# Patient Record
Sex: FEMALE | Race: BLACK OR AFRICAN AMERICAN | Employment: UNEMPLOYED | URBAN - METROPOLITAN AREA
[De-identification: names, ages, dates, MRNs, and addresses within clinical notes are randomized per-mention and may not be internally consistent; named-entity substitution may affect disease eponyms.]

---

## 2017-11-02 ENCOUNTER — APPOINTMENT (OUTPATIENT)
Dept: GENERAL RADIOLOGY | Age: 27
End: 2017-11-02
Attending: EMERGENCY MEDICINE
Payer: SELF-PAY

## 2017-11-02 ENCOUNTER — HOSPITAL ENCOUNTER (EMERGENCY)
Age: 27
Discharge: HOME | End: 2017-11-02
Attending: EMERGENCY MEDICINE
Payer: SELF-PAY

## 2017-11-02 VITALS
HEART RATE: 98 BPM | WEIGHT: 150 LBS | TEMPERATURE: 97.7 F | HEIGHT: 65 IN | SYSTOLIC BLOOD PRESSURE: 128 MMHG | BODY MASS INDEX: 24.99 KG/M2 | RESPIRATION RATE: 17 BRPM | OXYGEN SATURATION: 100 % | DIASTOLIC BLOOD PRESSURE: 75 MMHG

## 2017-11-02 DIAGNOSIS — R10.84 ABDOMINAL PAIN, GENERALIZED: Primary | ICD-10-CM

## 2017-11-02 DIAGNOSIS — K59.00 CONSTIPATION, UNSPECIFIED CONSTIPATION TYPE: ICD-10-CM

## 2017-11-02 LAB
ALBUMIN SERPL-MCNC: 4.2 G/DL (ref 3.4–5)
ALBUMIN/GLOB SERPL: 0.8 {RATIO} (ref 0.8–1.7)
ALP SERPL-CCNC: 64 U/L (ref 45–117)
ALT SERPL-CCNC: 15 U/L (ref 13–56)
ANION GAP SERPL CALC-SCNC: 8 MMOL/L (ref 3–18)
APPEARANCE UR: CLEAR
AST SERPL-CCNC: 15 U/L (ref 15–37)
BACTERIA URNS QL MICRO: ABNORMAL /HPF
BASOPHILS # BLD: 0 K/UL (ref 0–0.06)
BASOPHILS NFR BLD: 0 % (ref 0–2)
BILIRUB SERPL-MCNC: 0.3 MG/DL (ref 0.2–1)
BILIRUB UR QL: NEGATIVE
BUN SERPL-MCNC: 12 MG/DL (ref 7–18)
BUN/CREAT SERPL: 16 (ref 12–20)
CALCIUM SERPL-MCNC: 9.3 MG/DL (ref 8.5–10.1)
CHLORIDE SERPL-SCNC: 103 MMOL/L (ref 100–108)
CO2 SERPL-SCNC: 30 MMOL/L (ref 21–32)
COLOR UR: YELLOW
CREAT SERPL-MCNC: 0.74 MG/DL (ref 0.6–1.3)
DIFFERENTIAL METHOD BLD: ABNORMAL
EOSINOPHIL # BLD: 0.1 K/UL (ref 0–0.4)
EOSINOPHIL NFR BLD: 2 % (ref 0–5)
EPITH CASTS URNS QL MICRO: ABNORMAL /LPF (ref 0–5)
ERYTHROCYTE [DISTWIDTH] IN BLOOD BY AUTOMATED COUNT: 13.1 % (ref 11.6–14.5)
GLOBULIN SER CALC-MCNC: 5.5 G/DL (ref 2–4)
GLUCOSE SERPL-MCNC: 100 MG/DL (ref 74–99)
GLUCOSE UR STRIP.AUTO-MCNC: NEGATIVE MG/DL
HCG UR QL: NEGATIVE
HCT VFR BLD AUTO: 44.2 % (ref 35–45)
HGB BLD-MCNC: 13.8 G/DL (ref 12–16)
HGB UR QL STRIP: NEGATIVE
KETONES UR QL STRIP.AUTO: NEGATIVE MG/DL
LEUKOCYTE ESTERASE UR QL STRIP.AUTO: ABNORMAL
LIPASE SERPL-CCNC: 138 U/L (ref 73–393)
LYMPHOCYTES # BLD: 1.5 K/UL (ref 0.9–3.6)
LYMPHOCYTES NFR BLD: 23 % (ref 21–52)
MCH RBC QN AUTO: 27.4 PG (ref 24–34)
MCHC RBC AUTO-ENTMCNC: 31.2 G/DL (ref 31–37)
MCV RBC AUTO: 87.9 FL (ref 74–97)
MONOCYTES # BLD: 0.4 K/UL (ref 0.05–1.2)
MONOCYTES NFR BLD: 6 % (ref 3–10)
MUCOUS THREADS URNS QL MICRO: ABNORMAL /LPF
NEUTS SEG # BLD: 4.5 K/UL (ref 1.8–8)
NEUTS SEG NFR BLD: 69 % (ref 40–73)
NITRITE UR QL STRIP.AUTO: NEGATIVE
PH UR STRIP: 6.5 [PH] (ref 5–8)
PLATELET # BLD AUTO: 422 K/UL (ref 135–420)
PMV BLD AUTO: 9.2 FL (ref 9.2–11.8)
POTASSIUM SERPL-SCNC: 3.8 MMOL/L (ref 3.5–5.5)
PROT SERPL-MCNC: 9.7 G/DL (ref 6.4–8.2)
PROT UR STRIP-MCNC: NEGATIVE MG/DL
RBC # BLD AUTO: 5.03 M/UL (ref 4.2–5.3)
RBC #/AREA URNS HPF: ABNORMAL /HPF (ref 0–5)
SODIUM SERPL-SCNC: 141 MMOL/L (ref 136–145)
SP GR UR REFRACTOMETRY: 1.03 (ref 1–1.03)
UROBILINOGEN UR QL STRIP.AUTO: 1 EU/DL (ref 0.2–1)
WBC # BLD AUTO: 6.5 K/UL (ref 4.6–13.2)
WBC URNS QL MICRO: ABNORMAL /HPF (ref 0–4)

## 2017-11-02 PROCEDURE — 80053 COMPREHEN METABOLIC PANEL: CPT | Performed by: EMERGENCY MEDICINE

## 2017-11-02 PROCEDURE — 96375 TX/PRO/DX INJ NEW DRUG ADDON: CPT

## 2017-11-02 PROCEDURE — 81025 URINE PREGNANCY TEST: CPT | Performed by: EMERGENCY MEDICINE

## 2017-11-02 PROCEDURE — 81001 URINALYSIS AUTO W/SCOPE: CPT | Performed by: EMERGENCY MEDICINE

## 2017-11-02 PROCEDURE — 85025 COMPLETE CBC W/AUTO DIFF WBC: CPT | Performed by: EMERGENCY MEDICINE

## 2017-11-02 PROCEDURE — 99283 EMERGENCY DEPT VISIT LOW MDM: CPT

## 2017-11-02 PROCEDURE — 83690 ASSAY OF LIPASE: CPT | Performed by: EMERGENCY MEDICINE

## 2017-11-02 PROCEDURE — 96374 THER/PROPH/DIAG INJ IV PUSH: CPT

## 2017-11-02 PROCEDURE — 96361 HYDRATE IV INFUSION ADD-ON: CPT

## 2017-11-02 PROCEDURE — 74022 RADEX COMPL AQT ABD SERIES: CPT

## 2017-11-02 PROCEDURE — 74011250636 HC RX REV CODE- 250/636: Performed by: EMERGENCY MEDICINE

## 2017-11-02 RX ORDER — POLYETHYLENE GLYCOL 3350 17 G/17G
17 POWDER, FOR SOLUTION ORAL DAILY
Qty: 119 G | Refills: 0 | Status: SHIPPED | OUTPATIENT
Start: 2017-11-02 | End: 2018-11-18

## 2017-11-02 RX ORDER — KETOROLAC TROMETHAMINE 30 MG/ML
30 INJECTION, SOLUTION INTRAMUSCULAR; INTRAVENOUS
Status: COMPLETED | OUTPATIENT
Start: 2017-11-02 | End: 2017-11-02

## 2017-11-02 RX ORDER — ONDANSETRON 2 MG/ML
4 INJECTION INTRAMUSCULAR; INTRAVENOUS
Status: COMPLETED | OUTPATIENT
Start: 2017-11-02 | End: 2017-11-02

## 2017-11-02 RX ADMIN — KETOROLAC TROMETHAMINE 30 MG: 30 INJECTION, SOLUTION INTRAMUSCULAR at 08:35

## 2017-11-02 RX ADMIN — ONDANSETRON 4 MG: 2 INJECTION INTRAMUSCULAR; INTRAVENOUS at 08:05

## 2017-11-02 RX ADMIN — SODIUM CHLORIDE 1000 ML: 900 INJECTION, SOLUTION INTRAVENOUS at 08:05

## 2017-11-02 NOTE — ED PROVIDER NOTES
HPI Comments: 7:51 AM Jen Galindo is a 28 y.o. female presents to ED c/o intermittent left sided flank pain ongoing for the past two months however the pain progressively worsened and became more constant . Pain level 8/10 and wakes her up out of her sleep. Tried Tylenol, no relief. Associated symptoms include vomiting, one time today and one time one month ago. Pt was last seen in the ED for the same symptoms two months ago. She was diagnosed with a pulled muscle and prescribed medication. Reports she took all the medication however there was no relief. While in ED last time she had no CT scan, only gave an urine sample. Pt states she has an appointment with her PCP on tomorrow however the pain increased, which prompted her to come to the ED on today. LMP two weeks ago. Denies any injury. Pt states probable chance of pregnancy however she has been having her normal menstrual cycle. No tobacco or illegal drug use. Drinks occasionally. No medical problems. Denies dysuria, fever, change in appetite. No further complaints at the moment. The history is provided by the patient. Past Medical History:   Diagnosis Date    Other ill-defined conditions     Vaginal birth 2012       No past surgical history on file. No family history on file. Social History     Social History    Marital status: SINGLE     Spouse name: N/A    Number of children: N/A    Years of education: N/A     Occupational History    Not on file. Social History Main Topics    Smoking status: Never Smoker    Smokeless tobacco: Not on file    Alcohol use No    Drug use: No    Sexual activity: Not on file     Other Topics Concern    Not on file     Social History Narrative    No narrative on file         ALLERGIES: Review of patient's allergies indicates no known allergies. Review of Systems   Constitutional: Negative. Negative for appetite change, chills, diaphoresis, fatigue and fever. HENT: Negative.   Negative for congestion, rhinorrhea and sore throat. Eyes: Negative. Negative for pain, discharge and redness. Respiratory: Negative. Negative for cough, chest tightness, shortness of breath and wheezing. Cardiovascular: Negative. Negative for chest pain and palpitations. Gastrointestinal: Positive for vomiting. Negative for abdominal pain, constipation, diarrhea and nausea. Genitourinary: Positive for flank pain. Negative for dysuria, frequency, hematuria and urgency. Musculoskeletal: Negative for back pain and neck pain. Skin: Negative. Negative for rash. Neurological: Negative. Negative for dizziness, syncope, weakness, light-headedness, numbness and headaches. Psychiatric/Behavioral: Negative. All other systems reviewed and are negative. Vitals:    11/02/17 0751 11/02/17 0755   BP: 128/75    Pulse: 98    Resp: 17    Temp:  97.7 °F (36.5 °C)   SpO2: 100%    Weight: 68 kg (150 lb)    Height: 5' 5\" (1.651 m)             Physical Exam   Constitutional: She appears well-developed and well-nourished. Non-toxic appearance. She does not have a sickly appearance. She does not appear ill. No distress. HENT:   Head: Normocephalic and atraumatic. Mouth/Throat: Oropharynx is clear and moist. No oropharyngeal exudate. Eyes: Conjunctivae and EOM are normal. Pupils are equal, round, and reactive to light. No scleral icterus. Neck: Normal range of motion. Neck supple. No hepatojugular reflux and no JVD present. No tracheal deviation present. No thyromegaly present. Cardiovascular: Normal rate, regular rhythm, S1 normal, S2 normal, normal heart sounds, intact distal pulses and normal pulses. Exam reveals no gallop, no S3 and no S4. No murmur heard. Pulses:       Radial pulses are 2+ on the right side, and 2+ on the left side. Dorsalis pedis pulses are 2+ on the right side, and 2+ on the left side. Pulmonary/Chest: Effort normal and breath sounds normal. No respiratory distress.  She has no decreased breath sounds. She has no wheezes. She has no rhonchi. She has no rales. Abdominal: Soft. Normal appearance and bowel sounds are normal. She exhibits no distension and no mass. There is no hepatosplenomegaly. There is no tenderness. There is no rigidity, no rebound, no guarding, no CVA tenderness, no tenderness at McBurney's point and negative Adams's sign. Musculoskeletal: Normal range of motion. Strength 5/5 throughout    Lymphadenopathy:        Head (right side): No submental, no submandibular, no preauricular and no occipital adenopathy present. Head (left side): No submental, no submandibular, no preauricular and no occipital adenopathy present. She has no cervical adenopathy. Right: No supraclavicular adenopathy present. Left: No supraclavicular adenopathy present. Neurological: She is alert. She has normal strength and normal reflexes. She is not disoriented. No cranial nerve deficit or sensory deficit. Coordination and gait normal. GCS eye subscore is 4. GCS verbal subscore is 5. GCS motor subscore is 6. Grossly intact    Skin: Skin is warm, dry and intact. No rash noted. She is not diaphoretic. Psychiatric: She has a normal mood and affect. Her speech is normal and behavior is normal. Judgment and thought content normal. Cognition and memory are normal.   Nursing note and vitals reviewed. MDM  Number of Diagnoses or Management Options  Abdominal pain, generalized:   Constipation, unspecified constipation type:   Diagnosis management comments: DDX: Gastritis, gerd, peptic ulcer disease, cholecystitis, pancreatitis, gastroenteritis, hepatitis, constipation related pain, appendicitis pain, diverticulitis, urinary tract infection, obstruction, abdominal wall pain, atypical cardiac (ami or anginal pain), referred pain from pulmonary process (pneumonia, empyema), ectopic pregnancy,  or combination of the above versus many other processes.       ED Course       Procedures      Labs essentially normal. Lipase normal. UA negative. Pregnancy negative. XR Abd, mild to moderate amount of stool. No acute process. 8:45 AM 11/2/2017    I have reassessed the patient. Patient is feeling better. Patient will be prescribed Miralax. Patient was discharged in stable condition. Patient is to return to emergency department if any new or worsening condition. Clinical Impression:   1. Abdominal Pain   2. Constipation        Scribe Attestation     Geena Echevarria (Aj) acting as a scribe for and in the presence of [facility], DO      November 02, 2017 at 8:10 AM       Provider Attestation:      I personally performed the services described in the documentation, reviewed the documentation, as recorded by the scribe in my presence, and it accurately and completely records my words and actions.  November 02, 2017 at 1102 [facility], DO

## 2017-11-02 NOTE — ED TRIAGE NOTES
Left lower abdominal pain that radiates to left low back \"since July\" with vomiting (5x) that started today.

## 2017-11-02 NOTE — ED NOTES
Diana Reddy is a 28 y.o. female that was discharged in stable condition. The patients diagnosis, condition and treatment were explained to  patient and aftercare instructions were given. The patient verbalized understanding. Patient armband removed and shredded.

## 2017-11-02 NOTE — DISCHARGE INSTRUCTIONS
Abdominal Pain: Care Instructions  Your Care Instructions    Abdominal pain has many possible causes. Some aren't serious and get better on their own in a few days. Others need more testing and treatment. If your pain continues or gets worse, you need to be rechecked and may need more tests to find out what is wrong. You may need surgery to correct the problem. Don't ignore new symptoms, such as fever, nausea and vomiting, urination problems, pain that gets worse, and dizziness. These may be signs of a more serious problem. Your doctor may have recommended a follow-up visit in the next 8 to 12 hours. If you are not getting better, you may need more tests or treatment. The doctor has checked you carefully, but problems can develop later. If you notice any problems or new symptoms, get medical treatment right away. Follow-up care is a key part of your treatment and safety. Be sure to make and go to all appointments, and call your doctor if you are having problems. It's also a good idea to know your test results and keep a list of the medicines you take. How can you care for yourself at home? · Rest until you feel better. · To prevent dehydration, drink plenty of fluids, enough so that your urine is light yellow or clear like water. Choose water and other caffeine-free clear liquids until you feel better. If you have kidney, heart, or liver disease and have to limit fluids, talk with your doctor before you increase the amount of fluids you drink. · If your stomach is upset, eat mild foods, such as rice, dry toast or crackers, bananas, and applesauce. Try eating several small meals instead of two or three large ones. · Wait until 48 hours after all symptoms have gone away before you have spicy foods, alcohol, and drinks that contain caffeine. · Do not eat foods that are high in fat. · Avoid anti-inflammatory medicines such as aspirin, ibuprofen (Advil, Motrin), and naproxen (Aleve).  These can cause stomach upset. Talk to your doctor if you take daily aspirin for another health problem. When should you call for help? Call 911 anytime you think you may need emergency care. For example, call if:  ? · You passed out (lost consciousness). ? · You pass maroon or very bloody stools. ? · You vomit blood or what looks like coffee grounds. ? · You have new, severe belly pain. ?Call your doctor now or seek immediate medical care if:  ? · Your pain gets worse, especially if it becomes focused in one area of your belly. ? · You have a new or higher fever. ? · Your stools are black and look like tar, or they have streaks of blood. ? · You have unexpected vaginal bleeding. ? · You have symptoms of a urinary tract infection. These may include:  ¨ Pain when you urinate. ¨ Urinating more often than usual.  ¨ Blood in your urine. ? · You are dizzy or lightheaded, or you feel like you may faint. ? Watch closely for changes in your health, and be sure to contact your doctor if:  ? · You are not getting better after 1 day (24 hours). Where can you learn more? Go to http://[employer].Mediakassi.info/. Enter K206 in the search box to learn more about \"Abdominal Pain: Care Instructions. \"  Current as of: March 20, 2017  Content Version: 11.4  © 4956-0468 [employer]. Care instructions adapted under license by [employer] (which disclaims liability or warranty for this information). If you have questions about a medical condition or this instruction, always ask your healthcare professional. [place] [address] any warranty or liability for your use of this information. Constipation: Care Instructions  Your Care Instructions    Constipation means that you have a hard time passing stools (bowel movements). People pass stools from 3 times a day to once every 3 days. What is normal for you may be different.  Constipation may occur with pain in the rectum and cramping. The pain may get worse when you try to pass stools. Sometimes there are small amounts of bright red blood on toilet paper or the surface of stools. This is because of enlarged veins near the rectum (hemorrhoids). A few changes in your diet and lifestyle may help you avoid ongoing constipation. Your doctor may also prescribe medicine to help loosen your stool. Some medicines can cause constipation. These include pain medicines and antidepressants. Tell your doctor about all the medicines you take. Your doctor may want to make a medicine change to ease your symptoms. Follow-up care is a key part of your treatment and safety. Be sure to make and go to all appointments, and call your doctor if you are having problems. It's also a good idea to know your test results and keep a list of the medicines you take. How can you care for yourself at home? · Drink plenty of fluids, enough so that your urine is light yellow or clear like water. If you have kidney, heart, or liver disease and have to limit fluids, talk with your doctor before you increase the amount of fluids you drink. · Include high-fiber foods in your diet each day. These include fruits, vegetables, beans, and whole grains. · Get at least 30 minutes of exercise on most days of the week. Walking is a good choice. You also may want to do other activities, such as running, swimming, cycling, or playing tennis or team sports. · Take a fiber supplement, such as Citrucel or Metamucil, every day. Read and follow all instructions on the label. · Schedule time each day for a bowel movement. A daily routine may help. Take your time having your bowel movement. · Support your feet with a small step stool when you sit on the toilet. This helps flex your hips and places your pelvis in a squatting position. · Your doctor may recommend an over-the-counter laxative to relieve your constipation. Examples are Milk of Magnesia and MiraLax.  Read and follow all instructions on the label. Do not use laxatives on a long-term basis. When should you call for help? Call your doctor now or seek immediate medical care if:  ? · You have new or worse belly pain. ? · You have new or worse nausea or vomiting. ? · You have blood in your stools. ? Watch closely for changes in your health, and be sure to contact your doctor if:  ? · Your constipation is getting worse. ? · You do not get better as expected. Where can you learn more? Go to http://almita-kassi.info/. Enter 21  in the search box to learn more about \"Constipation: Care Instructions. \"  Current as of: March 20, 2017  Content Version: 11.4  © 5402-3126 [employer]. Care instructions adapted under license by [employer] (which disclaims liability or warranty for this information). If you have questions about a medical condition or this instruction, always ask your healthcare professional. [place] [address] any warranty or liability for your use of this information.

## 2018-11-18 ENCOUNTER — HOSPITAL ENCOUNTER (EMERGENCY)
Age: 28
Discharge: HOME | End: 2018-11-18
Attending: EMERGENCY MEDICINE
Payer: MEDICAID

## 2018-11-18 VITALS
WEIGHT: 140 LBS | TEMPERATURE: 98.2 F | HEIGHT: 65 IN | OXYGEN SATURATION: 100 % | DIASTOLIC BLOOD PRESSURE: 66 MMHG | RESPIRATION RATE: 16 BRPM | SYSTOLIC BLOOD PRESSURE: 107 MMHG | HEART RATE: 83 BPM | BODY MASS INDEX: 23.32 KG/M2

## 2018-11-18 DIAGNOSIS — J02.9 ACUTE PHARYNGITIS, UNSPECIFIED ETIOLOGY: Primary | ICD-10-CM

## 2018-11-18 PROCEDURE — 99282 EMERGENCY DEPT VISIT SF MDM: CPT

## 2018-11-18 RX ORDER — ACETAMINOPHEN 325 MG/1
TABLET ORAL
COMMUNITY
End: 2019-03-31

## 2018-11-18 RX ORDER — PENICILLIN V POTASSIUM 500 MG/1
500 TABLET, FILM COATED ORAL 3 TIMES DAILY
Qty: 30 TAB | Refills: 0 | Status: SHIPPED | OUTPATIENT
Start: 2018-11-18 | End: 2018-11-28

## 2018-11-18 NOTE — ED PROVIDER NOTES
EMERGENCY DEPARTMENT HISTORY AND PHYSICAL EXAM 
 
9:23 AM 
 
 
Date: 11/18/2018 Patient Name: Greyson Alvarado History of Presenting Illness Chief Complaint Patient presents with  Sore Throat History Provided By: Patient Chief Complaint: Sore Throat Duration: 10 Days Timing:  Constant Location: N/A Quality: N/A Severity: N/A Modifying Factors: Exacerbated by swallowing; no relief with Motrin Associated Symptoms: Pain to right side of tongue; \"popping\" sensation in ears Additional History (Context): Greyson Alvarado is a 33 y.o. female presenting to the ED c/o constant sore throat for the past 10 days. Reports taking Motrin with no relief. States sore throat is exacerbated by swallowing. Pt also reports pain to the right side of her tongue and \"popping\" sensation in her ears when chewing. States she saw her PCP 6 days ago and had negative strep test. Denies rash and any other symptoms or complaints. PCP: None Current Outpatient Medications Medication Sig Dispense Refill  acetaminophen (TYLENOL) 325 mg tablet Take  by mouth every four (4) hours as needed for Pain.  penicillin v potassium (VEETID) 500 mg tablet Take 1 Tab by mouth three (3) times daily for 10 days. 30 Tab 0 Past History Past Medical History: 
Past Medical History:  
Diagnosis Date  Other ill-defined conditions(929.37) Vaginal birth 2012  Ovarian cyst   
 
 
Past Surgical History: 
History reviewed. No pertinent surgical history. Family History: 
History reviewed. No pertinent family history. Social History: 
Social History Tobacco Use  Smoking status: Never Smoker  Smokeless tobacco: Never Used Substance Use Topics  Alcohol use: No  
 Drug use: No  
 
 
Allergies: 
No Known Allergies Review of Systems Review of Systems Constitutional: Negative for fever. HENT: Positive for sore throat. Respiratory: Negative for shortness of breath. All other systems reviewed and are negative. Physical Exam  
 
Visit Vitals /66 Pulse 83 Temp 98.2 °F (36.8 °C) Resp 16 Ht 5' 5\" (1.651 m) Wt 63.5 kg (140 lb) LMP 11/09/2018 SpO2 100% BMI 23.30 kg/m² Physical Exam  
Constitutional: She is oriented to person, place, and time. She appears well-developed and well-nourished. No distress. HENT:  
Head: Normocephalic and atraumatic. Fluid post R TM, normal color. OP mildly injected, no exudate. Tongue w/o lesions. Tiny apthous ulcer to inner lower R cheek Eyes: No scleral icterus. Cardiovascular: Normal rate and regular rhythm. Pulmonary/Chest: Effort normal and breath sounds normal. No respiratory distress. She has no wheezes. Lymphadenopathy:  
  She has no cervical adenopathy. Neurological: She is alert and oriented to person, place, and time. Skin: Skin is warm and dry. Psychiatric: She has a normal mood and affect. Nursing note and vitals reviewed. Diagnostic Study Results Labs - No results found for this or any previous visit (from the past 12 hour(s)). Radiologic Studies - No orders to display Medical Decision Making I am the first provider for this patient. I reviewed the vital signs, available nursing notes, past medical history, past surgical history, family history and social history. Vital Signs-Reviewed the patient's vital signs. Records Reviewed: Nursing Notes and Old Medical Records (Time of Review: 9:23 AM) ED Course: Progress Notes, Reevaluation, and Consults: 
Imp; Pharyngitis. Seen w/ neg strep. Ongoing sxs x 10 days. No fever or lymph node enlargement to suggest mono. Will empirically cover for non-steep bacterial sources although likely viral w/ lingering symptoms. Diagnosis Clinical Impression: 1. Acute pharyngitis, unspecified etiology 1) Salt water gargles 2) Rest 
3) Plenty of fluids 4) Tylenol or Motrin for pain and fever 5) Penicillin 6) Follow up with your PCP for recheck in 5-7 days if not improved. Disposition: home Follow-up Information Follow up With Specialties Details Why Contact Info [facility]  In 1 week As needed [address] [place] [address] 
[phone] Medication List  
  
START taking these medications   
penicillin v potassium 500 mg tablet Commonly known as:  VEETID Take 1 Tab by mouth three (3) times daily for 10 days. ASK your doctor about these medications TYLENOL 325 mg tablet Generic drug:  acetaminophen Where to Get Your Medications Information about where to get these medications is not yet available Ask your nurse or doctor about these medications · penicillin v potassium 500 mg tablet 
  
 
_______________________________ Scribe Attestation Levan Kayser acting as a scribe for and in the presence of Lisa Montano MD     
November 18, 2018 at 9:23 AM 
    
Provider Attestation:     
I personally performed the services described in the documentation, reviewed the documentation, as recorded by the scribe in my presence, and it accurately and completely records my words and actions. November 18, 2018 at 9:23 AM - Lisa Montano MD   
 
_______________________________

## 2018-11-18 NOTE — ED TRIAGE NOTES
Pt reports 11/8 developed sore throat and using OTC medication without relief. Saw PMD this past week and given medication for Thrush. States today had tongue pain and painful swallowing. Handing oral secretions and speaking in full sentences without difficulty. Presents in NAD.

## 2018-11-18 NOTE — DISCHARGE INSTRUCTIONS
1) Salt water gargles  2) Rest  3) Plenty of fluids  4) Tylenol or Motrin for pain and fever  5) Penicillin  6) Follow up with your PCP for recheck in 5-7 days if not improved. Sore Throat: Care Instructions  Your Care Instructions    Infection by bacteria or a virus causes most sore throats. Cigarette smoke, dry air, air pollution, allergies, and yelling can also cause a sore throat. Sore throats can be painful and annoying. Fortunately, most sore throats go away on their own. If you have a bacterial infection, your doctor may prescribe antibiotics. Follow-up care is a key part of your treatment and safety. Be sure to make and go to all appointments, and call your doctor if you are having problems. It's also a good idea to know your test results and keep a list of the medicines you take. How can you care for yourself at home? · If your doctor prescribed antibiotics, take them as directed. Do not stop taking them just because you feel better. You need to take the full course of antibiotics. · Gargle with warm salt water once an hour to help reduce swelling and relieve discomfort. Use 1 teaspoon of salt mixed in 1 cup of warm water. · Take an over-the-counter pain medicine, such as acetaminophen (Tylenol), ibuprofen (Advil, Motrin), or naproxen (Aleve). Read and follow all instructions on the label. · Be careful when taking over-the-counter cold or flu medicines and Tylenol at the same time. Many of these medicines have acetaminophen, which is Tylenol. Read the labels to make sure that you are not taking more than the recommended dose. Too much acetaminophen (Tylenol) can be harmful. · Drink plenty of fluids. Fluids may help soothe an irritated throat. Hot fluids, such as tea or soup, may help decrease throat pain. · Use over-the-counter throat lozenges to soothe pain. Regular cough drops or hard candy may also help. These should not be given to young children because of the risk of choking.   · Do not smoke or allow others to smoke around you. If you need help quitting, talk to your doctor about stop-smoking programs and medicines. These can increase your chances of quitting for good. · Use a vaporizer or humidifier to add moisture to your bedroom. Follow the directions for cleaning the machine. When should you call for help? Call your doctor now or seek immediate medical care if:    · You have new or worse trouble swallowing.     · Your sore throat gets much worse on one side.    Watch closely for changes in your health, and be sure to contact your doctor if you do not get better as expected. Where can you learn more? Go to http://almita-kassi.info/. Enter [id number] [id number] in the search box to learn more about \"Sore Throat: Care Instructions. \"  Current as of: March 28, 2018  Content Version: 11.8  © 9914-9490 Healthwise[employer]. Care instructions adapted under license by [employer] (which disclaims liability or warranty for this information). If you have questions about a medical condition or this instruction, always ask your healthcare professional. [address] any warranty or liability for your use of this information.

## 2018-11-18 NOTE — ED NOTES
I have reviewed discharge instructions with the patient. The patient verbalized understanding. Current Discharge Medication List  
  
START taking these medications Details  
penicillin v potassium (VEETID) 500 mg tablet Take 1 Tab by mouth three (3) times daily for 10 days. Qty: 30 Tab, Refills: 0 Patient armband removed and shredded

## 2019-01-15 NOTE — PROGRESS NOTES
[address], [address], 9360 [facility]  [address]  [place], [address]   [phone]  Manny Fothergill DO      Patient ID:  Name:  Valdez Gil  MRN:  [id number]  :  1990/28 y.o. Date:  2019      HISTORY OF PRESENT ILLNESS:  Valdez Gil is a 33 y.o.   premenopausal female referred by Dr. Olga Burris for left ovarian cyst. Ultrasound on 19 revealed 9 cm left septated cystic mass. U/S from  showed left complex cyst. She was  then scheduled for LSO with Dr. Olga Burris. Unfortunately her  was elevated to 103.9 which prompted referral to our practice. Currently, Denies Vaginal bleeding, change in vaginal discharge, new abdominopelvic pain, change in bladder/bowel habits      Labs:  2019 : 103.9      Imaging  2019 TVUS  Uterus: 5.56 x 4.40 x 5.20 cm  Endometrium: 1.7 cm  Cervical length: 5.10 cm  Firbroma: 1.26 x 1.06 x 1.61 cm   L ovary: 3.87 x 2.75 x 3.44 cm   R ovary: 3.64 x 3.10 x 3.99 cm   L mass: 9.13 x 6.27 x 7.69    IMPRESSION:    The uterus is anteverted and homogenous in appearance. The lining is thick, a small anterior intramural myoma is identified. The right ovary appears normal. There is a septated left cystic mass measured  Above. No free fluid seen. ROS:   As above      There are no active problems to display for this patient. Past Medical History:   Diagnosis Date    Other ill-defined conditions(799.89)     Vaginal birth     Ovarian cyst       History reviewed. No pertinent surgical history.    OB History     No data available        Social History     Tobacco Use    Smoking status: Never Smoker    Smokeless tobacco: Never Used   Substance Use Topics    Alcohol use: No     Comment: socially      Family History   Problem Relation Age of Onset   [facility] Breast Cancer Mother     Diabetes Mother     Cancer Maternal Aunt     Cancer Maternal Grandmother     Cancer Maternal Grandfather     Prostate Cancer Paternal Grandfather     Diabetes Paternal Grandfather       Current Outpatient Medications   Medication Sig    naproxen sodium (ALEVE) 220 mg cap Take  by mouth.  acetaminophen (TYLENOL) 325 mg tablet Take  by mouth every four (4) hours as needed for Pain. No current facility-administered medications for this visit. No Known Allergies       OBJECTIVE:    Physical Exam  VITAL SIGNS: Visit Vitals  /82 (BP 1 Location: Left arm, BP Patient Position: Sitting)   Pulse (!) 107   Temp 97.6 °F (36.4 °C) (Oral)   Resp 18   Ht 5' 5\" (1.651 m)   Wt 61 kg (134 lb 8 oz)   LMP 01/05/2019 (Exact Date)   SpO2 100%   BMI 22.38 kg/m²      GENERAL ISIDRA: in no apparent distress and well developed and well nourished   MUSCULOSKEL: no joint tenderness, deformity or swelling   INTEGUMENT:  warm and dry, no rashes or lesions   ABDOMEN . soft, NT, ND, No masses appreciated   EXTREMITIES: extremities normal, atraumatic, no cyanosis or edema   PELVIC: External genitalia: normal general appearance  Vaginal: normal mucosa without prolapse or lesions  Cervix: normal appearance  Adnexa: mass, left  Uterus: normal single, nontender   RECTAL: deferred   ELIOT SURVEY: Cervical, supraclavicular, axillary and inguinal nodes normal.   NEURO: Grossly normal         IMPRESSION/PLAN:  1. Pelvic mass, elevated    -reviewed her imaging and bloodwork and explained likely benign, however, would recommend surgical evaluation   -discussed recommendation for resection of mass/LSO, with intraop pathology and staging if malignant   -discussed laparoscopic approach. -surgery to be scheduled at [facility] on 1/30   -Risks, benefits and alternatives of surgery discussed in detail and written consent obtained. Risks including bleeding, infection, blood clot, injury to nearby organs including bladder, bowel, ureters and blood vessels.     -will add LDH, Inhibin, AFP to preop labs          The total time spent was 60 minutes regarding this patients diagnosis of pelvic mass, elevated  and >50% of this time was spent counseling and coordinating care    [address] [place] [address]  Gynecologic Oncology  1/18/201910:28 AM

## 2019-01-18 ENCOUNTER — OFFICE VISIT (OUTPATIENT)
Dept: ONCOLOGY | Age: 29
End: 2019-01-18

## 2019-01-18 ENCOUNTER — HOSPITAL ENCOUNTER (OUTPATIENT)
Dept: LAB | Age: 29
Discharge: HOME | End: 2019-01-18

## 2019-01-18 VITALS
DIASTOLIC BLOOD PRESSURE: 82 MMHG | HEART RATE: 107 BPM | OXYGEN SATURATION: 100 % | RESPIRATION RATE: 18 BRPM | BODY MASS INDEX: 22.41 KG/M2 | TEMPERATURE: 97.6 F | WEIGHT: 134.5 LBS | SYSTOLIC BLOOD PRESSURE: 125 MMHG | HEIGHT: 65 IN

## 2019-01-18 DIAGNOSIS — R19.00 PELVIC MASS IN FEMALE: ICD-10-CM

## 2019-01-18 DIAGNOSIS — Z01.818 PREOP TESTING: ICD-10-CM

## 2019-01-18 DIAGNOSIS — R19.00 PELVIC MASS IN FEMALE: Primary | ICD-10-CM

## 2019-01-18 LAB — SENTARA SPECIMEN COL,SENBCF: NORMAL

## 2019-01-18 PROCEDURE — 99001 SPECIMEN HANDLING PT-LAB: CPT

## 2019-01-18 RX ORDER — CHOLECALCIFEROL (VITAMIN D3) 125 MCG
CAPSULE ORAL AS NEEDED
COMMUNITY

## 2019-01-18 NOTE — PROGRESS NOTES
Rachel Hunt, a 33 y.o. female,  is here for   Chief Complaint   Patient presents with    New Patient     referred by Dr. Denise Mac, elevaed , cystic mass       Visit Vitals  /82 (BP 1 Location: Left arm, BP Patient Position: Sitting)   Pulse (!) 107   Temp 97.6 °F (36.4 °C) (Oral)   Resp 18   Ht 5' 5\" (1.651 m)   Wt 61 kg (134 lb 8 oz)   SpO2 100%   BMI 22.38 kg/m²       Patient reports some left side pain in her abdomen, stats she usually has some pain after menstrual cycle is completed, \" not much this month though \"  Patient denies any persistent or worsening abdominal or pelvic pain. Denies any unusual vaginal bleeding, discharge, irritation, or odor. Denies experiencing any diarrhea, explains she does have issues with constipation but admits she doesn't drink water. No burning, discomfort, or irritation with urination.

## 2019-01-18 NOTE — PATIENT INSTRUCTIONS
How to Prepare for Surgery  How do you prepare for surgery? Surgery can be stressful. This information will help you understand what you can expect. And it will help you safely prepare for surgery. Follow-up care is a key part of your treatment and safety. Be sure to make and go to all appointments, and call your doctor if you are having problems. It's also a good idea to know your test results and keep a list of the medicines you take. What happens before surgery?   Preparing for surgery    · Understand exactly what surgery is planned, along with the risks, benefits, and other options. · Tell your doctors ALL the medicines, vitamins, supplements, and herbal remedies you take. Some of these can increase the risk of bleeding or interact with anesthesia.     · If you take blood thinners, such as warfarin (Coumadin), clopidogrel (Plavix), or aspirin, be sure to talk to your doctor. He or she will tell you if you should stop taking these medicines before your surgery. Make sure that you understand exactly what your doctor wants you to do.     · Your doctor will tell you which medicines to take or stop before your surgery. You may need to stop taking certain medicines a week or more before surgery. So talk to your doctor as soon as you can.     · If you have an advance directive, let your doctor know. It may include a living will and a durable power of  for health care. Bring a copy to the hospital. If don't have one, you may want to prepare one. It lets your doctor and loved ones know your health care wishes. Doctors advise that everyone prepare these papers before any type of surgery or procedure. What happens on the day of surgery?    · Follow the instructions about when to stop eating and drinking. If you don't, your surgery may be canceled.  If your doctor told you to take your medicines on the day of surgery, take them with only a sip of water.     · Take a bath or shower before coming in for your surgery. Do not apply lotions, perfumes, deodorants, or nail polish.     · Do not shave the surgical site yourself.     · Take off all jewelry and piercings. And take out contact lenses, if you wear them.    At the hospital or surgery center   · Bring a picture ID.     · The area for surgery is often marked to make sure there are no errors.     · You will be kept comfortable and safe by your anesthesia provider. The anesthesia may make you sleep. Or it may just numb the area being worked on. Going home   · Be sure you have someone to drive you home. Anesthesia and pain medicine make it unsafe for you to drive.     · You will be given more specific instructions about recovering from your surgery. They will cover things like diet, wound care, follow-up care, driving, and getting back to your normal routine. When should you call your doctor? · You have questions or concerns.     · You don't understand how to prepare for your surgery.     · You become ill before the surgery (such as fever, flu, or a cold).     · You need to reschedule or have changed your mind about having the surgery. Where can you learn more? Go to http://almita-kassi.info/. Enter Q270 in the search box to learn more about \"How to Prepare for Surgery. \"  Current as of: March 28, 2018  Content Version: 11.9  © 2539-9464 [employer]. Care instructions adapted under license by [employer] (which disclaims liability or warranty for this information). If you have questions about a medical condition or this instruction, always ask your healthcare professional. [place] [address] any warranty or liability for your use of this information.

## 2019-01-20 LAB
ABSOLUTE LYMPHOCYTE COUNT, 10803: 1.9 K/UL (ref 1–4.8)
AFP, SERUM, TUMOR MARKER, 002266: 2.4 NG/ML
ANION GAP SERPL CALC-SCNC: 13 MMOL/L
BASOPHILS # BLD: 0 K/UL (ref 0–0.2)
BASOPHILS NFR BLD: 0 % (ref 0–2)
BUN SERPL-MCNC: 10 MG/DL (ref 6–22)
CALCIUM SERPL-MCNC: 8.9 MG/DL (ref 8.4–10.5)
CHLORIDE SERPL-SCNC: 101 MMOL/L (ref 98–110)
CO2 SERPL-SCNC: 27 MMOL/L (ref 20–32)
CREAT SERPL-MCNC: 0.5 MG/DL (ref 0.5–1.2)
EOSINOPHIL # BLD: 0.2 K/UL (ref 0–0.5)
EOSINOPHIL NFR BLD: 3 % (ref 0–6)
ERYTHROCYTE [DISTWIDTH] IN BLOOD BY AUTOMATED COUNT: 13.9 % (ref 10–15.5)
GFRAA, 66117: >60
GFRNA, 66118: >60
GLUCOSE SERPL-MCNC: 81 MG/DL (ref 70–99)
GRANULOCYTES,GRANS: 62 % (ref 40–75)
HCG, BETA, HCGTLT: <1 MIU/ML
HCT VFR BLD AUTO: 38.3 % (ref 35.1–46.5)
HGB BLD-MCNC: 11.8 G/DL (ref 11.7–15.5)
LDH SERPL L TO P-CCNC: 163 U/L (ref 98–192)
LYMPHOCYTES, LYMLT: 27 % (ref 20–45)
MCH RBC QN AUTO: 29 PG (ref 26–34)
MCHC RBC AUTO-ENTMCNC: 31 G/DL (ref 31–36)
MCV RBC AUTO: 93 FL (ref 80–95)
MONOCYTES # BLD: 0.5 K/UL (ref 0.1–1)
MONOCYTES NFR BLD: 7 % (ref 3–12)
NEUTROPHILS # BLD AUTO: 4.3 K/UL (ref 1.8–7.7)
PLATELET # BLD AUTO: 424 K/UL (ref 140–440)
PMV BLD AUTO: 9.9 FL (ref 9–13)
POTASSIUM SERPL-SCNC: 4.4 MMOL/L (ref 3.5–5.5)
RBC # BLD AUTO: 4.13 M/UL (ref 3.8–5.2)
SODIUM SERPL-SCNC: 141 MMOL/L (ref 133–145)
WBC # BLD AUTO: 6.8 K/UL (ref 4–11)

## 2019-01-23 NOTE — PROGRESS NOTES
mIan iWlkins, a 33 y.o. female,  is here for   Chief Complaint   Patient presents with    Patient Education     surgical consult       Visit Vitals  /69 (BP 1 Location: Right arm, BP Patient Position: Sitting)   Pulse 80   Temp 98.1 °F (36.7 °C) (Oral)   Resp 18   Ht 5' 5\" (1.651 m)   Wt 61 kg (134 lb 6.4 oz)   SpO2 100%   BMI 22.37 kg/m²       Jyoti Simpson was educated and given pre-operative and post-operative instructions for Laparoscopic resection of pelvic mass, laparoscopic salpingo oopherectomy, possible [facility] time [facility] 1000 am. Patient signed a consent form, and expressed an understanding of the procedure. All patients questions were addressed. Patient was given the office number if any questions or concerns were to arise.

## 2019-01-24 ENCOUNTER — OFFICE VISIT (OUTPATIENT)
Dept: ONCOLOGY | Age: 29
End: 2019-01-24

## 2019-01-24 VITALS
TEMPERATURE: 98.1 F | HEIGHT: 65 IN | RESPIRATION RATE: 18 BRPM | SYSTOLIC BLOOD PRESSURE: 108 MMHG | OXYGEN SATURATION: 100 % | WEIGHT: 134.4 LBS | BODY MASS INDEX: 22.39 KG/M2 | DIASTOLIC BLOOD PRESSURE: 69 MMHG | HEART RATE: 80 BPM

## 2019-01-24 DIAGNOSIS — Z71.89 ENCOUNTER FOR SURGICAL COUNSELING: Primary | ICD-10-CM

## 2019-01-24 NOTE — PATIENT INSTRUCTIONS
Laparoscopic Oophorectomy: Before Your Surgery  What is a laparoscopic oophorectomy? Oophorectomy (say \"nm-an-qlc-REK-tuh-twyla\") is a type of surgery. It removes one or both of your ovaries. Your ovaries store and release eggs so that you can get pregnant. They also make female sex hormones. You will be asleep during the surgery. The doctor puts a lighted tube and other tools through small cuts in your belly. The cuts are called incisions. The tube is called a scope. It lets your doctor see your ovaries. If it's too hard to work through the scope, the doctor may make a larger incision. The incisions leave scars that fade with time. After surgery, you will probably have pain for several days. If the doctor takes out both ovaries, you can no longer get pregnant. You will also start menopause if you haven't already started it. Follow-up care is a key part of your treatment and safety. Be sure to make and go to all appointments, and call your doctor if you are having problems. It's also a good idea to know your test results and keep a list of the medicines you take. What happens before surgery?   Surgery can be stressful. This information will help you understand what you can expect. And it will help you safely prepare for surgery.   Preparing for surgery    · Understand exactly what surgery is planned, along with the risks, benefits, and other options. · Tell your doctors ALL the medicines, vitamins, supplements, and herbal remedies you take. Some of these can increase the risk of bleeding or interact with anesthesia.     · If you take blood thinners, such as warfarin (Coumadin), clopidogrel (Plavix), or aspirin, be sure to talk to your doctor. He or she will tell you if you should stop taking these medicines before your surgery. Make sure that you understand exactly what your doctor wants you to do.     · Your doctor will tell you which medicines to take or stop before your surgery.  You may need to stop taking certain medicines a week or more before surgery. So talk to your doctor as soon as you can.     · If you have an advance directive, let your doctor know. It may include a living will and a durable power of  for health care. Bring a copy to the hospital. If you don't have one, you may want to prepare one. It lets your doctor and loved ones know your health care wishes. Doctors advise that everyone prepare these papers before any type of surgery or procedure. What happens on the day of surgery? · Follow the instructions exactly about when to stop eating and drinking. If you don't, your surgery may be canceled. If your doctor told you to take your medicines on the day of surgery, take them with only a sip of water.     · Take a bath or shower before you come in for your surgery. Do not apply lotions, perfumes, deodorants, or nail polish.     · Do not shave the surgical site yourself.     · Take off all jewelry and piercings. And take out contact lenses, if you wear them.    At the hospital or surgery center   · Bring a picture ID.     · The area for surgery is often marked to make sure there are no errors.     · You will be kept comfortable and safe by your anesthesia provider. You will be asleep during the surgery. Going home   · Be sure you have someone to drive you home. Anesthesia and pain medicine make it unsafe for you to drive.     · You will be given more specific instructions about recovering from your surgery. They will cover things like diet, wound care, follow-up care, driving, and getting back to your normal routine. When should you call your doctor? · You have questions or concerns.     · You don't understand how to prepare for your surgery.     · You become ill before the surgery (such as fever, flu, or a cold).     · You need to reschedule or have changed your mind about having the surgery. Where can you learn more? Go to http://almita-kassi.info/.   Enter C476 in the search box to learn more about \"Laparoscopic Oophorectomy: Before Your Surgery. \"  Current as of: May 14, 2018  Content Version: 11.9  © 9913-8973 [employer]. Care instructions adapted under license by [employer] (which disclaims liability or warranty for this information). If you have questions about a medical condition or this instruction, always ask your healthcare professional. [place] [address] any warranty or liability for your use of this information.

## 2019-01-25 ENCOUNTER — HOSPITAL ENCOUNTER (OUTPATIENT)
Dept: LAB | Age: 29
Discharge: HOME | End: 2019-01-25

## 2019-01-25 ENCOUNTER — HOSPITAL ENCOUNTER (OUTPATIENT)
Dept: PREADMISSION TESTING | Age: 29
Discharge: HOME | End: 2019-01-25
Payer: MEDICAID

## 2019-01-25 DIAGNOSIS — Z01.818 PREOP TESTING: ICD-10-CM

## 2019-01-25 LAB
ABO + RH BLD: NORMAL
BLOOD GROUP ANTIBODIES SERPL: NORMAL
SPECIMEN EXP DATE BLD: NORMAL

## 2019-01-25 PROCEDURE — 86900 BLOOD TYPING SEROLOGIC ABO: CPT

## 2019-01-25 PROCEDURE — 36415 COLL VENOUS BLD VENIPUNCTURE: CPT

## 2019-01-25 PROCEDURE — 99001 SPECIMEN HANDLING PT-LAB: CPT

## 2019-01-29 LAB — SENTARA SPECIMEN COL,SENBCF: NORMAL

## 2019-03-31 ENCOUNTER — HOSPITAL ENCOUNTER (EMERGENCY)
Age: 29
Discharge: HOME | End: 2019-03-31
Attending: EMERGENCY MEDICINE | Admitting: EMERGENCY MEDICINE
Payer: MEDICAID

## 2019-03-31 ENCOUNTER — APPOINTMENT (OUTPATIENT)
Dept: GENERAL RADIOLOGY | Age: 29
End: 2019-03-31
Attending: PHYSICIAN ASSISTANT
Payer: MEDICAID

## 2019-03-31 VITALS
SYSTOLIC BLOOD PRESSURE: 114 MMHG | RESPIRATION RATE: 18 BRPM | BODY MASS INDEX: 21.66 KG/M2 | TEMPERATURE: 99.6 F | HEIGHT: 65 IN | HEART RATE: 95 BPM | OXYGEN SATURATION: 99 % | WEIGHT: 130 LBS | DIASTOLIC BLOOD PRESSURE: 71 MMHG

## 2019-03-31 DIAGNOSIS — J06.9 VIRAL URI WITH COUGH: Primary | ICD-10-CM

## 2019-03-31 PROCEDURE — 87081 CULTURE SCREEN ONLY: CPT

## 2019-03-31 PROCEDURE — 71046 X-RAY EXAM CHEST 2 VIEWS: CPT

## 2019-03-31 PROCEDURE — 99282 EMERGENCY DEPT VISIT SF MDM: CPT

## 2019-03-31 RX ORDER — BENZONATATE 100 MG/1
100 CAPSULE ORAL
Qty: 30 CAP | Refills: 0 | Status: SHIPPED | OUTPATIENT
Start: 2019-03-31 | End: 2019-04-07

## 2019-03-31 NOTE — LETTER
NOTIFICATION OF RETURN TO WORK / SCHOOL 
3/31/2019 Ms. Yas Dos Santos [place] [id number] [address] [address] To Whom it may concern, Please excuse Yas Dos Santos from work 4/1/19 for medical reasons.    
 
 
 
 
 
 
Sincerely,  
 
 
 
 
Mikel Parada PA-C

## 2019-03-31 NOTE — ED TRIAGE NOTES
Pt reports week long history of cough, sneezing, generalized malaise, throat irritation. Was seen at urgent care and was told could be allergy related but medication not helping.

## 2019-03-31 NOTE — ED PROVIDER NOTES
EMERGENCY DEPARTMENT HISTORY AND PHYSICAL EXAM 
 
7:53 PM 
 
 
Date: 3/31/2019 Patient Name: Carolyn Arzola History of Presenting Illness Chief Complaint Patient presents with  Flu Like Symptoms History Provided By: Patient Chief Complaint: cough Additional History (Context): Carolyn Arzola is a 33 y.o. female with No significant past medical history who presents with cough. She has dry cough, nasal congestion, scratchy throat that started yesterday. She denies fevers, body aches. But does feel chills. Denies nausea, vomiting, diarrhea, abdominal pain, dysuria. No other symptoms. The pain is 7 out of 10. She has taken Mucinex for the symptoms without relief. PCP: JOSEPHINE Mariano Current Outpatient Medications Medication Sig Dispense Refill  benzonatate (TESSALON PERLES) 100 mg capsule Take 1 Cap by mouth three (3) times daily as needed for Cough for up to 7 days. 30 Cap 0  
 naproxen sodium (ALEVE) 220 mg cap Take  by mouth. Past History Past Medical History: 
Past Medical History:  
Diagnosis Date  Other ill-defined conditions(799.89) Vaginal birth 2012  Ovarian cyst   
 
 
Past Surgical History: 
History reviewed. No pertinent surgical history. Family History: 
Family History Problem Relation Age of Onset  Breast Cancer Mother  Diabetes Mother  Cancer Maternal Aunt  Cancer Maternal Grandmother  Cancer Maternal Grandfather  Prostate Cancer Paternal Grandfather  Diabetes Paternal Grandfather Social History: 
Social History Tobacco Use  Smoking status: Never Smoker  Smokeless tobacco: Never Used Substance Use Topics  Alcohol use: No  
  Comment: socially  Drug use: No  
 
 
Allergies: 
No Known Allergies Review of Systems Review of Systems Constitutional: Negative for fever. HENT: Positive for congestion and sore throat. Negative for facial swelling. Eyes: Negative for visual disturbance. Respiratory: Positive for cough. Negative for shortness of breath. Cardiovascular: Negative for chest pain. Gastrointestinal: Negative for abdominal pain. Genitourinary: Negative for dysuria. Musculoskeletal: Negative for neck pain. Skin: Negative for rash. Neurological: Negative for dizziness. Psychiatric/Behavioral: Negative for confusion. All other systems reviewed and are negative. Physical Exam  
 
Visit Vitals /71 (BP 1 Location: Left arm, BP Patient Position: At rest) Pulse 95 Temp 99.6 °F (37.6 °C) Resp 18 Ht 5' 5\" (1.651 m) Wt 59 kg (130 lb) LMP 03/27/2019 (Exact Date) SpO2 99% BMI 21.63 kg/m² Physical Exam  
Constitutional: She is oriented to person, place, and time. She appears well-developed and well-nourished. No distress. HENT:  
Head: Normocephalic and atraumatic. Right Ear: Tympanic membrane, external ear and ear canal normal.  
Left Ear: Tympanic membrane, external ear and ear canal normal.  
Nose: Nose normal. Right sinus exhibits no maxillary sinus tenderness and no frontal sinus tenderness. Left sinus exhibits no maxillary sinus tenderness and no frontal sinus tenderness. Mouth/Throat: Uvula is midline, oropharynx is clear and moist and mucous membranes are normal. No oropharyngeal exudate, posterior oropharyngeal edema, posterior oropharyngeal erythema or tonsillar abscesses. Eyes: Conjunctivae are normal.  
Neck: Normal range of motion. Neck supple. Cardiovascular: Normal rate, regular rhythm and normal heart sounds. Pulmonary/Chest: Effort normal and breath sounds normal.  
Abdominal: She exhibits no distension. Musculoskeletal: Normal range of motion. Lymphadenopathy:  
  She has no cervical adenopathy. Neurological: She is alert and oriented to person, place, and time. Skin: Skin is warm and dry. She is not diaphoretic. Psychiatric: She has a normal mood and affect. Nursing note and vitals reviewed. Diagnostic Study Results Labs - Recent Results (from the past 12 hour(s)) STREP THROAT SCREEN Collection Time: 03/31/19  8:36 PM  
Result Value Ref Range Special Requests: NO SPECIAL REQUESTS Strep Screen NEGATIVE Culture result: PENDING Radiologic Studies -  
XR CHEST PA LAT    (Results Pending) Medical Decision Making I am the first provider for this patient. I reviewed the vital signs, available nursing notes, past medical history, past surgical history, family history and social history. Vital Signs-Reviewed the patient's vital signs. Records Reviewed: Nursing Notes (Time of Review: 7:53 PM) ED Course: Progress Notes, Reevaluation, and Consults: 
 
Provider Notes (Medical Decision Making): MDM Number of Diagnoses or Management Options Viral URI with cough:  
Diagnosis management comments: Well-appearing 57-year-old female with cough congestion and sore throat. Strep is negative. Exam is normal.  Chest x-ray negative for pneumonia. Likely viral, treat symptoms. Discussed treatment plan, return precautions, symptomatic relief, and expected time to improvement. All questions answered. Patient is stable for discharge and outpatient management. Diagnosis Clinical Impression: 1. Viral URI with cough Disposition: Discharged Follow-up Information Follow up With Specialties Details Why Contact Info JOSEPHINE Adame Physician Assistant In 1 week If symptoms do not improve [id number] [facility] EMERGENCY DEPT Emergency Medicine  Immediately if symptoms worsen [address] [place] [address] [facility] [id number] [phone] Patient's Medications Start Taking BENZONATATE (TESSALON PERLES) 100 MG CAPSULE    Take 1 Cap by mouth three (3) times daily as needed for Cough for up to 7 days. Continue Taking NAPROXEN SODIUM (ALEVE) 220 MG CAP    Take  by mouth. These Medications have changed No medications on file Stop Taking ACETAMINOPHEN (TYLENOL) 325 MG TABLET    Take  by mouth every four (4) hours as needed for Pain.  
 
_______________________________ Attestations: 
Scribe Attestation Mikel KACI Parada PA-C acting as a scribe for and in the presence of [employer], [employer] March 31, 2019 at 9:42 PM 
    
Provider Attestation:     
I personally performed the services described in the documentation, reviewed the documentation, as recorded by the scribe in my presence, and it accurately and completely records my words and actions. March 31, 2019 at 9:42 PM - BREANN Akins 
_______________________________

## 2019-04-01 NOTE — DISCHARGE INSTRUCTIONS
Patient Education        Upper Respiratory Infection (Cold): Care Instructions  Your Care Instructions    An upper respiratory infection, or URI, is an infection of the nose, sinuses, or throat. URIs are spread by coughs, sneezes, and direct contact. The common cold is the most frequent kind of URI. The flu and sinus infections are other kinds of URIs. Almost all URIs are caused by viruses. Antibiotics won't cure them. But you can treat most infections with home care. This may include drinking lots of fluids and taking over-the-counter pain medicine. You will probably feel better in 4 to 10 days. The doctor has checked you carefully, but problems can develop later. If you notice any problems or new symptoms, get medical treatment right away. Follow-up care is a key part of your treatment and safety. Be sure to make and go to all appointments, and call your doctor if you are having problems. It's also a good idea to know your test results and keep a list of the medicines you take. How can you care for yourself at home? · To prevent dehydration, drink plenty of fluids, enough so that your urine is light yellow or clear like water. Choose water and other caffeine-free clear liquids until you feel better. If you have kidney, heart, or liver disease and have to limit fluids, talk with your doctor before you increase the amount of fluids you drink. · Take an over-the-counter pain medicine, such as acetaminophen (Tylenol), ibuprofen (Advil, Motrin), or naproxen (Aleve). Read and follow all instructions on the label. · Before you use cough and cold medicines, check the label. These medicines may not be safe for young children or for people with certain health problems. · Be careful when taking over-the-counter cold or flu medicines and Tylenol at the same time. Many of these medicines have acetaminophen, which is Tylenol. Read the labels to make sure that you are not taking more than the recommended dose.  Too much acetaminophen (Tylenol) can be harmful. · Get plenty of rest.  · Do not smoke or allow others to smoke around you. If you need help quitting, talk to your doctor about stop-smoking programs and medicines. These can increase your chances of quitting for good. When should you call for help? Call 911 anytime you think you may need emergency care. For example, call if:    · You have severe trouble breathing.    Call your doctor now or seek immediate medical care if:    · You seem to be getting much sicker.     · You have new or worse trouble breathing.     · You have a new or higher fever.     · You have a new rash.    Watch closely for changes in your health, and be sure to contact your doctor if:    · You have a new symptom, such as a sore throat, an earache, or sinus pain.     · You cough more deeply or more often, especially if you notice more mucus or a change in the color of your mucus.     · You do not get better as expected. Where can you learn more? Go to http://almita-kassi.info/. Enter H871 in the search box to learn more about \"Upper Respiratory Infection (Cold): Care Instructions. \"  Current as of: September 5, 2018  Content Version: 11.9  © 3403-7676 [employer]. Care instructions adapted under license by [employer] (which disclaims liability or warranty for this information). If you have questions about a medical condition or this instruction, always ask your healthcare professional. [place] [address] any warranty or liability for your use of this information.

## 2019-04-02 ENCOUNTER — TELEPHONE (OUTPATIENT)
Dept: ONCOLOGY | Age: 29
End: 2019-04-02

## 2019-04-02 LAB
B-HEM STREP THROAT QL CULT: NEGATIVE
BACTERIA SPEC CULT: NORMAL
SERVICE CMNT-IMP: NORMAL

## 2019-04-02 NOTE — TELEPHONE ENCOUNTER
Attempted to clarify pt upcoming appointment and surgery. No answer. No identifying information on voicemail. Will call back pt.

## 2019-04-02 NOTE — TELEPHONE ENCOUNTER
Patient called office stating that she is not feeling well, has been seen in urgent care and ED both this week with no relief of symptoms. She was not prescribed antibiotics, \"just allergy medicine\" per report. Recommend that patient call PCP office to schedule prompt f/u re: current symptoms. Requested call back to our office once scheduled so that we may adjust surgical counseling appointment PRN. Patient agreeable.

## 2019-04-04 ENCOUNTER — TELEPHONE (OUTPATIENT)
Dept: ONCOLOGY | Age: 29
End: 2019-04-04

## 2019-04-04 NOTE — TELEPHONE ENCOUNTER
Left voicemail encouraging patient to contact the office to get rescheduled for follow up appointment with Ashley. Office number was left on voicemail.

## 2019-04-04 NOTE — TELEPHONE ENCOUNTER
Patient contacted the office stating she had the flu and would like to wait until she was feeling better to reschedule her post op appointment. She will call us back once she is able to schedule.

## 2019-04-10 ENCOUNTER — TELEPHONE (OUTPATIENT)
Dept: ONCOLOGY | Age: 29
End: 2019-04-10

## 2019-04-10 NOTE — TELEPHONE ENCOUNTER
Left voicemail advising patient of new arrival time of 7:20 am with a start time of 9:20 am for her procedure with Dr. Corinne Daunt on 2/11/19. Patient was asked to call the office to confirm the new arrival and start time.

## 2019-04-10 NOTE — TELEPHONE ENCOUNTER
Tried reaching patient again regarding surgery for tomorrow. No counseling was completed for the patient, she had the flu at the time and wanted to postpone her appointment. Surgery will need postponed. Voicemail is now full and no answer on phone line.

## 2019-04-18 DIAGNOSIS — R19.00 PELVIC MASS IN FEMALE: ICD-10-CM

## 2019-06-27 ENCOUNTER — TELEPHONE (OUTPATIENT)
Dept: ONCOLOGY | Age: 29
End: 2019-06-27

## 2019-06-27 DIAGNOSIS — R19.00 PELVIC MASS IN FEMALE: Primary | ICD-10-CM

## 2019-06-27 DIAGNOSIS — R97.1 ELEVATED CA-125: ICD-10-CM

## 2019-06-27 NOTE — TELEPHONE ENCOUNTER
Informed pt of appointment scheduled for 07/05/2019 at 10:15am with an arrival of 10 am. Also informed pt to drink 32 oz of water prior to arrival for US. Patient verbalized understanding and agreed to plan. Patient instructed to contact office for any question or concerns.      Ethyl Loss, NP

## 2019-06-27 NOTE — TELEPHONE ENCOUNTER
Followed up with patient in regards to the cancelled surgery from April 2019. Patient stated that she would like to reschedule her surgery, however she would like a repeat US first. Spoke with NP Junior Ayala who was okay with this. Patient availability is as follows: Any day, preferably mid-day (1030). She prefers to have it done at the [facility]. Patient is aware that she should expect a call from EB Ayala later today to give her the date and time for her scan. Follow up appointment was scheduled for 7/10/2019 @ 1000 with Dr. Pilar Brown to review her US and discuss rescheduling her procedure. Patient was satisfied with this and had no further questions or concerns, encouraged to call back if she develops any.

## 2019-07-05 ENCOUNTER — HOSPITAL ENCOUNTER (OUTPATIENT)
Dept: ULTRASOUND IMAGING | Age: 29
Discharge: HOME | End: 2019-07-05
Attending: NURSE PRACTITIONER
Payer: MEDICAID

## 2019-07-05 DIAGNOSIS — R19.00 PELVIC MASS IN FEMALE: ICD-10-CM

## 2019-07-05 DIAGNOSIS — R97.1 ELEVATED CA-125: ICD-10-CM

## 2019-07-05 PROCEDURE — 93975 VASCULAR STUDY: CPT

## 2019-07-10 ENCOUNTER — OFFICE VISIT (OUTPATIENT)
Dept: ONCOLOGY | Age: 29
End: 2019-07-10

## 2019-07-10 VITALS
HEART RATE: 101 BPM | TEMPERATURE: 97.4 F | DIASTOLIC BLOOD PRESSURE: 79 MMHG | BODY MASS INDEX: 20.98 KG/M2 | OXYGEN SATURATION: 100 % | RESPIRATION RATE: 14 BRPM | SYSTOLIC BLOOD PRESSURE: 112 MMHG | HEIGHT: 65 IN | WEIGHT: 125.9 LBS

## 2019-07-10 DIAGNOSIS — R19.00 PELVIC MASS IN FEMALE: ICD-10-CM

## 2019-07-10 DIAGNOSIS — R97.1 ELEVATED CA-125: Primary | ICD-10-CM

## 2019-07-10 NOTE — H&P (VIEW-ONLY)
[address], [address] [place], [address] [facility] 
[address] [facility], [address] [place], [address] [place], [place] [id number] [phone] Miles Merinojoey JORGENSEN Patient ID: 
Name:  Claudell Sheldon MRN:  [id number] :  1990/28 y.o. Date:  7/10/2019 HISTORY OF PRESENT ILLNESS: 
Claudell Sheldon is a 33 y.o.   premenopausal female referred by Dr. Polo Shlel for left ovarian cyst. Ultrasound on 19 revealed 9 cm left septated cystic mass. U/S from 2017 showed left complex cyst. She was  then scheduled for LSO with Dr. Polo Shell. Unfortunately her  was elevated to 103.9 which prompted referral to our practice. Currently, Denies Vaginal bleeding, change in vaginal discharge,  change in bladder/bowel habits. Labs: 
2019 : 103.9 Imaging TVUS 2019 Transabdominal and transvaginal scanning was performed through the pelvis. 
  
The uterus measures approximately 8.9 x 6.5 x 5.3 cm. There is diffuse 
thickening of the endometrium which measures approximately 18 mm in thickness. There appears to be an approximately 1.4 x 1.3 cm hypoechoic area in the 
anterior body of the uterus consistent with a small uterine fibroid. 
  
The right ovary measures approximately 3.3 x 2.1 cm. There is normal color flow 
present in the right ovary. 
  
There is a large complex cystic structure present in the left adnexa which 
measures approximately 9.9 x 7.4 x 10.1 cm. This appears to be arising from the 
left ovary. There is no significant vascular flow seen in the cystic lesion. 
  
There appears to be a small amount of fluid present within the cul-de-sac. 
  
IMPRESSION IMPRESSION: Approximately 10.1 x 9.9 x 7.4 cm complex cyst with internal echoes 
seen in the left adnexa which appears to be arising from the left ovary.  This 
could represent a complex cyst, hemorrhagic cyst, endometrioma, or possibly a 
 cystic ovarian neoplasm. 
  
Diffuse thickening of the endometrium which measures up to 18 mm. Small uterine 
fibroid. Trace fluid present within the cul-de-sac. 
  
1/2/2019 TVUS Uterus: 5.56 x 4.40 x 5.20 cm Endometrium: 1.7 cm Cervical length: 5.10 cm Firbroma: 1.26 x 1.06 x 1.61 cm  
L ovary: 3.87 x 2.75 x 3.44 cm  
R ovary: 3.64 x 3.10 x 3.99 cm  
L mass: 9.13 x 6.27 x 7.69 IMPRESSION: 
 
The uterus is anteverted and homogenous in appearance. The lining is thick, a small anterior intramural myoma is identified. The right ovary appears normal. There is a septated left cystic mass measured  Above. No free fluid seen. ROS:  
As above There are no active problems to display for this patient. Past Medical History:  
Diagnosis Date  Other ill-defined conditions(799.89) Vaginal birth 2012  Ovarian cyst   
  
History reviewed. No pertinent surgical history. OB History None Social History Tobacco Use  Smoking status: Never Smoker  Smokeless tobacco: Never Used Substance Use Topics  Alcohol use: No  
  Comment: socially Family History Problem Relation Age of Onset  Breast Cancer Mother  Diabetes Mother  Cancer Maternal Aunt  Cancer Maternal Grandmother  Cancer Maternal Grandfather  Prostate Cancer Paternal Grandfather  Diabetes Paternal Grandfather Current Outpatient Medications Medication Sig  
 acetaminophen (TYLENOL PO) Take  by mouth as needed.  naproxen sodium (ALEVE) 220 mg cap Take  by mouth as needed. No current facility-administered medications for this visit. No Known Allergies OBJECTIVE: 
 
Physical Exam 
VITAL SIGNS: Visit Vitals /79 (BP 1 Location: Right arm, BP Patient Position: Sitting) Pulse (!) 101 Temp 97.4 °F (36.3 °C) (Oral) Resp 14 Ht 5' 5\" (1.651 m) Wt 57.1 kg (125 lb 14.4 oz) LMP 07/09/2019 (Exact Date) SpO2 100% BMI 20.95 kg/m² GENERAL ISIDRA: in no apparent distress and well developed and well nourished  
deferred IMPRESSION/PLAN: 
1. Pelvic mass, elevated  
 -reviewed her imaging which revealed persistent mass re-explained likely benign, however, would recommend surgical evaluation 
 -discussed recommendation for resection of mass/LSO, with intraop pathology and staging if malignant 
 -discussed laparoscopic approach.  
 -Risks, benefits and alternatives of surgery discussed in detail and written consent obtained. Risks including bleeding, infection, blood clot, injury to nearby organs including bladder, bowel, ureters and blood vessels. -will add LDH, Inhibin, AFP to preop labs The total time spent was 25 minutes regarding this patients diagnosis of pelvic mass, elevated  and >50% of this time was spent counseling and coordinating care Marty Bauer DO Gynecologic Oncology 7/10/397760:28 AM

## 2019-07-10 NOTE — PROGRESS NOTES
Jiles Ormond, a 33 y.o. female,  is here for   Chief Complaint   Patient presents with    Results     TVUS       Visit Vitals  /79 (BP 1 Location: Right arm, BP Patient Position: Sitting)   Pulse (!) 101   Temp 97.4 °F (36.3 °C) (Oral)   Resp 14   Ht 5' 5\" (1.651 m)   Wt 57.1 kg (125 lb 14.4 oz)   SpO2 100%   BMI 20.95 kg/m²       Patient reports current pain in her pelvic region, rated 5 out of 10. Cramping, patient started her menstrual yesterday. Denies experiencing any constipation or diarrhea. No burning, discomfort, or irritation with urination. 1. Have you been to the ER, urgent care clinic since your last visit? Hospitalized since your last visit? No    2. Have you seen or consulted any other health care providers outside of the [address] [place] [address] since your last visit? Include any pap smears or colon screening.  No

## 2019-07-10 NOTE — PROGRESS NOTES
[address], [address], [facility]  [address] [facility], [address] [place] [address]  [place], [address]   [phone]  Humbetro Fonseca DO      Patient ID:  Name:  Jaguar Lima  MRN:  [id number]  :  1990/28 y.o. Date:  7/10/2019      HISTORY OF PRESENT ILLNESS:  Jaguar Lima is a 33 y.o.   premenopausal female referred by Dr. Pascual Chopra for left ovarian cyst. Ultrasound on 19 revealed 9 cm left septated cystic mass. U/S from  showed left complex cyst. She was  then scheduled for LSO with Dr. Pascual Chopra. Unfortunately her  was elevated to 103.9 which prompted referral to our practice. Currently, Denies Vaginal bleeding, change in vaginal discharge,  change in bladder/bowel habits. Labs:  2019 : 103.9      Imaging  TVUS 2019  Transabdominal and transvaginal scanning was performed through the pelvis.     The uterus measures approximately 8.9 x 6.5 x 5.3 cm. There is diffuse  thickening of the endometrium which measures approximately 18 mm in thickness. There appears to be an approximately 1.4 x 1.3 cm hypoechoic area in the  anterior body of the uterus consistent with a small uterine fibroid.     The right ovary measures approximately 3.3 x 2.1 cm. There is normal color flow  present in the right ovary.     There is a large complex cystic structure present in the left adnexa which  measures approximately 9.9 x 7.4 x 10.1 cm. This appears to be arising from the  left ovary. There is no significant vascular flow seen in the cystic lesion.     There appears to be a small amount of fluid present within the cul-de-sac.     IMPRESSION  IMPRESSION: Approximately 10.1 x 9.9 x 7.4 cm complex cyst with internal echoes  seen in the left adnexa which appears to be arising from the left ovary.  This  could represent a complex cyst, hemorrhagic cyst, endometrioma, or possibly a  cystic ovarian neoplasm.     Diffuse thickening of the endometrium which measures up to 18 mm. Small uterine  fibroid. Trace fluid present within the cul-de-sac.     1/2/2019 TVUS  Uterus: 5.56 x 4.40 x 5.20 cm  Endometrium: 1.7 cm  Cervical length: 5.10 cm  Firbroma: 1.26 x 1.06 x 1.61 cm   L ovary: 3.87 x 2.75 x 3.44 cm   R ovary: 3.64 x 3.10 x 3.99 cm   L mass: 9.13 x 6.27 x 7.69    IMPRESSION:    The uterus is anteverted and homogenous in appearance. The lining is thick, a small anterior intramural myoma is identified. The right ovary appears normal. There is a septated left cystic mass measured  Above. No free fluid seen. ROS:   As above      There are no active problems to display for this patient. Past Medical History:   Diagnosis Date    Other ill-defined conditions(799.89)     Vaginal birth 2012    Ovarian cyst       History reviewed. No pertinent surgical history. OB History    None       Social History     Tobacco Use    Smoking status: Never Smoker    Smokeless tobacco: Never Used   Substance Use Topics    Alcohol use: No     Comment: socially      Family History   Problem Relation Age of Onset    Breast Cancer Mother     Diabetes Mother     Cancer Maternal Aunt     Cancer Maternal Grandmother     Cancer Maternal Grandfather     Prostate Cancer Paternal Grandfather     Diabetes Paternal Grandfather       Current Outpatient Medications   Medication Sig    acetaminophen (TYLENOL PO) Take  by mouth as needed.  naproxen sodium (ALEVE) 220 mg cap Take  by mouth as needed. No current facility-administered medications for this visit.       No Known Allergies       OBJECTIVE:    Physical Exam  VITAL SIGNS: Visit Vitals  /79 (BP 1 Location: Right arm, BP Patient Position: Sitting)   Pulse (!) 101   Temp 97.4 °F (36.3 °C) (Oral)   Resp 14   Ht 5' 5\" (1.651 m)   Wt 57.1 kg (125 lb 14.4 oz)   LMP 07/09/2019 (Exact Date)   SpO2 100%   BMI 20.95 kg/m²      GENERAL ISIDRA: in no apparent distress and well developed and well nourished   deferred      IMPRESSION/PLAN:  1. Pelvic mass, elevated    -reviewed her imaging which revealed persistent mass re-explained likely benign, however, would recommend surgical evaluation   -discussed recommendation for resection of mass/LSO, with intraop pathology and staging if malignant   -discussed laparoscopic approach.    -Risks, benefits and alternatives of surgery discussed in detail and written consent obtained. Risks including bleeding, infection, blood clot, injury to nearby organs including bladder, bowel, ureters and blood vessels.     -will add LDH, Inhibin, AFP to preop labs          The total time spent was 25 minutes regarding this patients diagnosis of pelvic mass, elevated  and >50% of this time was spent counseling and coordinating care    [address] [place] [address]  Gynecologic Oncology  7/10/858075:28 AM

## 2019-07-10 NOTE — PATIENT INSTRUCTIONS
Laparoscopy: Before Your Surgery What is laparoscopy? Laparoscopy (say \"fed-vy-EPCY-kugerson-pee\") is a type of surgery that uses very small cuts. These cuts are called incisions. To do this surgery, a doctor puts a lighted tube through incisions in your belly. This tube is called a scope. Then the doctor puts special tools through the tube to take out whatever is causing problems. This may be an organ, such as the spleen, gallbladder, or appendix. Or it could be an ovary, a fallopian tube, or part of the intestine. With this kind of surgery, a doctor can also repair a hernia. Or he or she can take out small tumors, cysts, or other growths. It can also be used to close a woman's fallopian tubes. For some surgeries, you can usually go home the same day. These include hernia repair and gallbladder removal. 
The incisions from the surgery usually leave several scars about half an inch long. Follow-up care is a key part of your treatment and safety. Be sure to make and go to all appointments, and call your doctor if you are having problems. It's also a good idea to know your test results and keep a list of the medicines you take. What happens before surgery? 
 Surgery can be stressful. This information will help you understand what you can expect. And it will help you safely prepare for your surgery. 
 Preparing for surgery 
  · Understand exactly what surgery is planned, along with the risks, benefits, and other options. · Tell your doctors ALL the medicines, vitamins, supplements, and herbal remedies you take. Some of these can increase the risk of bleeding or interact with anesthesia.  
  · If you take blood thinners, such as warfarin (Coumadin), clopidogrel (Plavix), or aspirin, be sure to talk to your doctor. He or she will tell you if you should stop taking these medicines before your surgery. Make sure that you understand exactly what your doctor wants you to do.   · Your doctor will tell you which medicines to take or stop before your surgery. You may need to stop taking certain medicines a week or more before surgery. So talk to your doctor as soon as you can.  
  · If you have an advance directive, let your doctor know. It may include a living will and a durable power of  for health care. Bring a copy to the hospital. If you don't have one, you may want to prepare one. It lets your doctor and loved ones know your health care wishes. Doctors advise that everyone prepare these papers before any type of surgery or procedure.  
  · You may need to empty your colon with an enema or laxative. Your doctor will tell you how to do this. What happens on the day of surgery? · Follow the instructions exactly about when to stop eating and drinking. If you don't, your surgery may be canceled. If your doctor told you to take your medicines on the day of surgery, take them with only a sip of water.  
  · Take a bath or shower before you come in for your surgery. Do not apply lotions, perfumes, deodorants, or nail polish.  
  · Do not shave the surgical site yourself.  
  · Take off all jewelry and piercings. And take out contact lenses, if you wear them.  
 At the hospital or surgery center · Bring a picture ID.  
  · The area for surgery is often marked to make sure there are no errors.  
  · You will be kept comfortable and safe by your anesthesia provider. You will be asleep during the surgery.  
  · The surgery may take about 1 to 4 hours. It depends on what needs to be done. Hernia surgery may take 1 to 2 hours. But surgery to remove the spleen or part of the bowel may take 3 to 4 hours. Going home · Be sure you have someone to drive you home. Anesthesia and pain medicine make it unsafe for you to drive.  
  · You will be given more specific instructions about recovering from your surgery.  They will cover things like diet, wound care, follow-up care, driving, and getting back to your normal routine. When should you call your doctor? · You have questions or concerns.  
  · You don't understand how to prepare for your surgery.  
  · You become ill before the surgery (such as fever, flu, or a cold).  
  · You need to reschedule or have changed your mind about having the surgery. Where can you learn more? Go to http://almita-kassi.info/. Enter T345 in the search box to learn more about \"Laparoscopy: Before Your Surgery. \" Current as of: March 27, 2018 Content Version: 11.9 © 0442-5233 [employer]. Care instructions adapted under license by [employer] (which disclaims liability or warranty for this information). If you have questions about a medical condition or this instruction, always ask your healthcare professional. [address] any warranty or liability for your use of this information.

## 2019-07-12 ENCOUNTER — TELEPHONE (OUTPATIENT)
Dept: ONCOLOGY | Age: 29
End: 2019-07-12

## 2019-07-12 DIAGNOSIS — Z01.818 PREOPERATIVE TESTING: Primary | ICD-10-CM

## 2019-07-12 NOTE — TELEPHONE ENCOUNTER
Patient called to schedule her surgery. It will be scheduled for 7/31/2019 @ 0900, arrival time of 0700. Surgical counseling to scheduled for 7/17/2019 @ 1330. Patient was instructed to go complete her preoperative testing on 7/19/2019 or 7/20/2019 @ [facility] [place]. Per Dr. Darvin Vieyra with verbal readback, the following preoperative order's were placed:  CBC, CMP, HCG, and Type and Screen  Associated Diagnosis: Preoperative Testing    Patient verbalized understanding and had no further questions or concerns. Encouraged to contact the office if she develops any.

## 2019-07-17 ENCOUNTER — OFFICE VISIT (OUTPATIENT)
Dept: ONCOLOGY | Age: 29
End: 2019-07-17

## 2019-07-17 VITALS
RESPIRATION RATE: 14 BRPM | WEIGHT: 127.9 LBS | DIASTOLIC BLOOD PRESSURE: 79 MMHG | HEART RATE: 85 BPM | HEIGHT: 65 IN | BODY MASS INDEX: 21.31 KG/M2 | OXYGEN SATURATION: 100 % | SYSTOLIC BLOOD PRESSURE: 115 MMHG | TEMPERATURE: 97.1 F

## 2019-07-17 DIAGNOSIS — Z71.89 ENCOUNTER FOR SURGICAL COUNSELING: Primary | ICD-10-CM

## 2019-07-17 NOTE — PROGRESS NOTES
Johanny President, a 33 y.o. female,  is here for   Chief Complaint   Patient presents with    Patient Education     surgical counseling       Visit Vitals  /79 (BP 1 Location: Left arm, BP Patient Position: Sitting)   Pulse 85   Temp 97.1 °F (36.2 °C) (Oral)   Resp 14   Ht 5' 5\" (1.651 m)   Wt 58 kg (127 lb 14.4 oz)   SpO2 100%   BMI 21.28 kg/m²        Reviewed consent form for [facility] and information packet for a Laparoscopic left salpingo-oophorectomy, resection of pelvic mass, and possible staging scheduled on 7/31/2019 at 0900 with an arrival time of 0700. Patient was given information packet that included pre-op and post-op instructions as well as the scheduled date, start time, arrival time, and length of the surgery and signed the consent form. Patient expressed understanding and had no further questions. Patient was encouraged to call if they have questions later. 1. Have you been to the ER, urgent care clinic since your last visit? Hospitalized since your last visit? No    2. Have you seen or consulted any other health care providers outside of the [address] [place] [address] since your last visit? Include any pap smears or colon screening.  No

## 2019-07-17 NOTE — PATIENT INSTRUCTIONS
Laparoscopic Oophorectomy: Before Your Surgery  What is a laparoscopic oophorectomy? Oophorectomy (say \"xc-nw-dxu-REK-tuh-twyla\") is a type of surgery. It removes one or both of your ovaries. Your ovaries store and release eggs so that you can get pregnant. They also make female sex hormones. You will be asleep during the surgery. The doctor puts a lighted tube and other tools through small cuts in your belly. The cuts are called incisions. The tube is called a scope. It lets your doctor see your ovaries. If it's too hard to work through the scope, the doctor may make a larger incision. The incisions leave scars that fade with time. After surgery, you will probably have pain for several days. If the doctor takes out both ovaries, you can no longer get pregnant. You will also start menopause if you haven't already started it. Follow-up care is a key part of your treatment and safety. Be sure to make and go to all appointments, and call your doctor if you are having problems. It's also a good idea to know your test results and keep a list of the medicines you take. What happens before surgery?   Surgery can be stressful. This information will help you understand what you can expect. And it will help you safely prepare for surgery.   Preparing for surgery    · Understand exactly what surgery is planned, along with the risks, benefits, and other options. · Tell your doctors ALL the medicines, vitamins, supplements, and herbal remedies you take. Some of these can increase the risk of bleeding or interact with anesthesia.     · If you take blood thinners, such as warfarin (Coumadin), clopidogrel (Plavix), or aspirin, be sure to talk to your doctor. He or she will tell you if you should stop taking these medicines before your surgery. Make sure that you understand exactly what your doctor wants you to do.     · Your doctor will tell you which medicines to take or stop before your surgery.  You may need to stop taking certain medicines a week or more before surgery. So talk to your doctor as soon as you can.     · If you have an advance directive, let your doctor know. It may include a living will and a durable power of  for health care. Bring a copy to the hospital. If you don't have one, you may want to prepare one. It lets your doctor and loved ones know your health care wishes. Doctors advise that everyone prepare these papers before any type of surgery or procedure. What happens on the day of surgery? · Follow the instructions exactly about when to stop eating and drinking. If you don't, your surgery may be canceled. If your doctor told you to take your medicines on the day of surgery, take them with only a sip of water.     · Take a bath or shower before you come in for your surgery. Do not apply lotions, perfumes, deodorants, or nail polish.     · Do not shave the surgical site yourself.     · Take off all jewelry and piercings. And take out contact lenses, if you wear them.    At the hospital or surgery center   · Bring a picture ID.     · The area for surgery is often marked to make sure there are no errors.     · You will be kept comfortable and safe by your anesthesia provider. You will be asleep during the surgery. Going home   · Be sure you have someone to drive you home. Anesthesia and pain medicine make it unsafe for you to drive.     · You will be given more specific instructions about recovering from your surgery. They will cover things like diet, wound care, follow-up care, driving, and getting back to your normal routine. When should you call your doctor? · You have questions or concerns.     · You don't understand how to prepare for your surgery.     · You become ill before the surgery (such as fever, flu, or a cold).     · You need to reschedule or have changed your mind about having the surgery. Where can you learn more? Go to http://almita-kassi.info/.   Enter C476 in the search box to learn more about \"Laparoscopic Oophorectomy: Before Your Surgery. \"  Current as of: May 14, 2018  Content Version: 11.9  © 5331-6567 [email or web address]. Care instructions adapted under license by [employer] (which disclaims liability or warranty for this information). If you have questions about a medical condition or this instruction, always ask your healthcare professional. [address] any warranty or liability for your use of this information. Laparoscopic Oophorectomy: What to Expect at [address] [place] [address]    After surgery to remove one or both ovaries, you may feel some pain in your belly for a few days. Your belly may also be swollen. You may have a change in your bowel movements for a few days. It's normal to also have some shoulder or back pain. This is caused by the gas your doctor put in your belly to help see your organs better. To help with pain, your doctor will prescribe medicines. You may need about 1 week to fully recover. It's important not to lift anything heavy for about 1 week. You can ask your doctor when it's okay to have sex. This care sheet gives you a general idea about how long it will take for you to recover. But each person recovers at a different pace. Follow the steps below to get better as quickly as possible. How can you care for yourself at home? Activity    · Rest when you feel tired.     · Be active. Walking is a good choice.     · Allow your body to heal. Don't move quickly or lift anything heavy until you are feeling better.     · Hold a pillow over your incisions when you cough or take deep breaths. This will support your belly and may help to decrease your pain.     · Do breathing exercises at home as instructed by your doctor. This will help prevent pneumonia. Diet    · You can eat your normal diet.  If your stomach is upset, try bland, low-fat foods like plain rice, broiled chicken, toast, and yogurt.     · Drink plenty of fluids (unless your doctor tells you not to).   · If your bowel movements are not regular right after surgery, try to avoid constipation and straining. Drink plenty of water. Your doctor may suggest fiber, a stool softener, or a mild laxative. Medicines    · Your doctor will tell you if and when you can restart your medicines. He or she will also give you instructions about taking any new medicines.     · If you take blood thinners, such as warfarin (Coumadin), clopidogrel (Plavix), or aspirin, be sure to talk to your doctor. He or she will tell you if and when to start taking those medicines again. Make sure that you understand exactly what your doctor wants you to do.     · Be safe with medicines. Read and follow all instructions on the label. ? If the doctor gave you a prescription medicine for pain, take it as prescribed. ? If you are not taking a prescription pain medicine, ask your doctor if you can take an over-the-counter medicine. Incision care    · If you have strips of tape on the cut (incision) the doctor made, leave the tape on for a week or until it falls off.     · Wash the area daily with warm, soapy water, and pat it dry. Don't use hydrogen peroxide or alcohol. They can slow healing.     · You may cover the area with a gauze bandage if it oozes fluid or rubs against clothing.     · Change the bandage every day.     · Keep the area clean and dry. Other instructions    · Wear loose, comfortable clothing. For a few weeks, avoid anything that puts pressure on your belly.     · You may want to use a heating pad on your belly to help with pain. Follow-up care is a key part of your treatment and safety. Be sure to make and go to all appointments, and call your doctor if you are having problems. It's also a good idea to know your test results and keep a list of the medicines you take. When should you call for help?   Call 911 anytime you think you may need emergency care. For example, call if:    · You passed out (lost consciousness).     · You have chest pain, are short of breath, or cough up blood.    Call your doctor now or seek immediate medical care if:    · You have pain that does not get better after you take pain medicine.     · You cannot pass stools or gas.     · You have vaginal discharge that has increased in amount or smells bad.     · You are sick to your stomach or cannot drink fluids.     · You have loose stitches, or your incision comes open.     · Bright red blood has soaked through the bandage over your incision.     · You have signs of infection, such as:  ? Increased pain, swelling, warmth, or redness. ? Red streaks leading from the incision. ? Pus draining from the incision. ? A fever.     · You have bright red vaginal bleeding that soaks one or more pads in an hour, or you have large clots.     · You have signs of a blood clot in your leg (called a deep vein thrombosis), such as:  ? Pain in your calf, back of the knee, thigh, or groin. ? Redness and swelling in your leg.    Watch closely for changes in your health, and be sure to contact your doctor if you have any problems. Where can you learn more? Go to http://almita-kassi.info/. Enter V759 in the search box to learn more about \"Laparoscopic Oophorectomy: What to Expect at Home. \"  Current as of: May 14, 2018  Content Version: 11.9  © 0882-1447 [employer]. Care instructions adapted under license by [employer] (which disclaims liability or warranty for this information). If you have questions about a medical condition or this instruction, always ask your healthcare professional. [place] [address] any warranty or liability for your use of this information.

## 2019-07-17 NOTE — PROGRESS NOTES
Abdoul Lentz, a 33 y.o. female,  is here for   Chief Complaint   Patient presents with    Patient Education     surgical counseling       Visit Vitals  /79 (BP 1 Location: Left arm, BP Patient Position: Sitting)   Pulse 85   Temp 97.1 °F (36.2 °C) (Oral)   Resp 14   Ht 5' 5\" (1.651 m)   Wt 58 kg (127 lb 14.4 oz)   SpO2 100%   BMI 21.28 kg/m²       Chart reviewed. All questions answered.        Jaguar Hall, NP

## 2019-07-22 ENCOUNTER — HOSPITAL ENCOUNTER (OUTPATIENT)
Dept: LAB | Age: 29
Discharge: HOME | End: 2019-07-22
Payer: MEDICAID

## 2019-07-22 DIAGNOSIS — Z01.818 PREOPERATIVE TESTING: ICD-10-CM

## 2019-07-22 LAB
ALBUMIN SERPL-MCNC: 3.7 G/DL (ref 3.4–5)
ALBUMIN/GLOB SERPL: 1 {RATIO} (ref 0.8–1.7)
ALP SERPL-CCNC: 54 U/L (ref 45–117)
ALT SERPL-CCNC: 13 U/L (ref 13–56)
ANION GAP SERPL CALC-SCNC: 6 MMOL/L (ref 3–18)
AST SERPL-CCNC: 8 U/L (ref 10–38)
BASOPHILS # BLD: 0 K/UL (ref 0–0.1)
BASOPHILS NFR BLD: 0 % (ref 0–2)
BILIRUB SERPL-MCNC: 0.3 MG/DL (ref 0.2–1)
BUN SERPL-MCNC: 9 MG/DL (ref 7–18)
BUN/CREAT SERPL: 14 (ref 12–20)
CALCIUM SERPL-MCNC: 8.9 MG/DL (ref 8.5–10.1)
CHLORIDE SERPL-SCNC: 107 MMOL/L (ref 100–111)
CO2 SERPL-SCNC: 29 MMOL/L (ref 21–32)
CREAT SERPL-MCNC: 0.64 MG/DL (ref 0.6–1.3)
DIFFERENTIAL METHOD BLD: ABNORMAL
EOSINOPHIL # BLD: 0.2 K/UL (ref 0–0.4)
EOSINOPHIL NFR BLD: 4 % (ref 0–5)
ERYTHROCYTE [DISTWIDTH] IN BLOOD BY AUTOMATED COUNT: 13.4 % (ref 11.6–14.5)
GLOBULIN SER CALC-MCNC: 3.7 G/DL (ref 2–4)
GLUCOSE SERPL-MCNC: 92 MG/DL (ref 74–99)
HCG SERPL-ACNC: <1 MIU/ML (ref 0–10)
HCT VFR BLD AUTO: 35.4 % (ref 35–45)
HGB BLD-MCNC: 11.5 G/DL (ref 12–16)
LYMPHOCYTES # BLD: 2.5 K/UL (ref 0.9–3.6)
LYMPHOCYTES NFR BLD: 47 % (ref 21–52)
MCH RBC QN AUTO: 28.3 PG (ref 24–34)
MCHC RBC AUTO-ENTMCNC: 32.5 G/DL (ref 31–37)
MCV RBC AUTO: 87.2 FL (ref 74–97)
MONOCYTES # BLD: 0.5 K/UL (ref 0.05–1.2)
MONOCYTES NFR BLD: 9 % (ref 3–10)
NEUTS SEG # BLD: 2.2 K/UL (ref 1.8–8)
NEUTS SEG NFR BLD: 40 % (ref 40–73)
PLATELET # BLD AUTO: 364 K/UL (ref 135–420)
PMV BLD AUTO: 9.1 FL (ref 9.2–11.8)
POTASSIUM SERPL-SCNC: 3.6 MMOL/L (ref 3.5–5.5)
PROT SERPL-MCNC: 7.4 G/DL (ref 6.4–8.2)
RBC # BLD AUTO: 4.06 M/UL (ref 4.2–5.3)
SODIUM SERPL-SCNC: 142 MMOL/L (ref 136–145)
WBC # BLD AUTO: 5.4 K/UL (ref 4.6–13.2)

## 2019-07-22 PROCEDURE — 85025 COMPLETE CBC W/AUTO DIFF WBC: CPT

## 2019-07-22 PROCEDURE — 84702 CHORIONIC GONADOTROPIN TEST: CPT

## 2019-07-22 PROCEDURE — 80053 COMPREHEN METABOLIC PANEL: CPT

## 2019-07-22 PROCEDURE — 86900 BLOOD TYPING SEROLOGIC ABO: CPT

## 2019-07-22 PROCEDURE — 36415 COLL VENOUS BLD VENIPUNCTURE: CPT

## 2019-07-23 LAB
ABO + RH BLD: NORMAL
BLOOD GROUP ANTIBODIES SERPL: NORMAL
SPECIMEN EXP DATE BLD: NORMAL

## 2019-07-24 DIAGNOSIS — Z01.818 PREOPERATIVE TESTING: Primary | ICD-10-CM

## 2019-07-25 ENCOUNTER — HOSPITAL ENCOUNTER (OUTPATIENT)
Dept: LAB | Age: 29
Discharge: HOME | End: 2019-07-25
Payer: MEDICAID

## 2019-07-25 DIAGNOSIS — Z01.818 PREOPERATIVE TESTING: ICD-10-CM

## 2019-07-25 LAB
ATRIAL RATE: 82 BPM
CALCULATED P AXIS, ECG09: 48 DEGREES
CALCULATED R AXIS, ECG10: 81 DEGREES
CALCULATED T AXIS, ECG11: 55 DEGREES
DIAGNOSIS, 93000: NORMAL
P-R INTERVAL, ECG05: 128 MS
Q-T INTERVAL, ECG07: 372 MS
QRS DURATION, ECG06: 90 MS
QTC CALCULATION (BEZET), ECG08: 434 MS
VENTRICULAR RATE, ECG03: 82 BPM

## 2019-07-25 PROCEDURE — 93005 ELECTROCARDIOGRAM TRACING: CPT

## 2019-07-30 ENCOUNTER — TELEPHONE (OUTPATIENT)
Dept: ONCOLOGY | Age: 29
End: 2019-07-30

## 2019-07-30 ENCOUNTER — ANESTHESIA EVENT (OUTPATIENT)
Dept: SURGERY | Age: 29
End: 2019-07-30
Payer: MEDICAID

## 2019-07-30 NOTE — TELEPHONE ENCOUNTER
Spoke with patient to confirm arrival time of 5:30 am for 7:30 am procedure with Dr. Murali Velazquez on 7/31/19.

## 2019-07-30 NOTE — TELEPHONE ENCOUNTER
Attempted to contact patient to confirm her surgery time for tomorrow. Encouraged to contact the main office as soon as possible.

## 2019-07-31 ENCOUNTER — HOSPITAL ENCOUNTER (OUTPATIENT)
Age: 29
Setting detail: OUTPATIENT SURGERY
Discharge: HOME | End: 2019-07-31
Attending: OBSTETRICS & GYNECOLOGY | Admitting: OBSTETRICS & GYNECOLOGY
Payer: MEDICAID

## 2019-07-31 ENCOUNTER — ANESTHESIA (OUTPATIENT)
Dept: SURGERY | Age: 29
End: 2019-07-31
Payer: MEDICAID

## 2019-07-31 VITALS
SYSTOLIC BLOOD PRESSURE: 117 MMHG | RESPIRATION RATE: 20 BRPM | DIASTOLIC BLOOD PRESSURE: 78 MMHG | TEMPERATURE: 96.4 F | HEIGHT: 65 IN | WEIGHT: 127 LBS | BODY MASS INDEX: 21.16 KG/M2 | HEART RATE: 72 BPM | OXYGEN SATURATION: 100 %

## 2019-07-31 DIAGNOSIS — G89.18 POST-OP PAIN: Primary | ICD-10-CM

## 2019-07-31 LAB — HCG UR QL: NEGATIVE

## 2019-07-31 PROCEDURE — 74011000250 HC RX REV CODE- 250: Performed by: OBSTETRICS & GYNECOLOGY

## 2019-07-31 PROCEDURE — 77030035048 HC TRCR ENDOSC OPTCL COVD -B: Performed by: OBSTETRICS & GYNECOLOGY

## 2019-07-31 PROCEDURE — 77030040361 HC SLV COMPR DVT MDII -B: Performed by: OBSTETRICS & GYNECOLOGY

## 2019-07-31 PROCEDURE — 77030039266 HC ADH SKN EXOFIN S2SG -A: Performed by: OBSTETRICS & GYNECOLOGY

## 2019-07-31 PROCEDURE — C9290 INJ, BUPIVACAINE LIPOSOME: HCPCS | Performed by: OBSTETRICS & GYNECOLOGY

## 2019-07-31 PROCEDURE — 77030016151 HC PROTCTR LNS DFOG COVD -B: Performed by: OBSTETRICS & GYNECOLOGY

## 2019-07-31 PROCEDURE — 77030002933 HC SUT MCRYL J&J -A: Performed by: OBSTETRICS & GYNECOLOGY

## 2019-07-31 PROCEDURE — 77030034850: Performed by: OBSTETRICS & GYNECOLOGY

## 2019-07-31 PROCEDURE — 74011000258 HC RX REV CODE- 258

## 2019-07-31 PROCEDURE — 88305 TISSUE EXAM BY PATHOLOGIST: CPT

## 2019-07-31 PROCEDURE — 81025 URINE PREGNANCY TEST: CPT

## 2019-07-31 PROCEDURE — 77030012012 HC AIRWY OP SFT TELE -A: Performed by: ANESTHESIOLOGY

## 2019-07-31 PROCEDURE — 74011250636 HC RX REV CODE- 250/636: Performed by: OBSTETRICS & GYNECOLOGY

## 2019-07-31 PROCEDURE — 74011250636 HC RX REV CODE- 250/636: Performed by: NURSE ANESTHETIST, CERTIFIED REGISTERED

## 2019-07-31 PROCEDURE — 74011250636 HC RX REV CODE- 250/636

## 2019-07-31 PROCEDURE — 77030026438 HC STYL ET INTUB CARD -A: Performed by: ANESTHESIOLOGY

## 2019-07-31 PROCEDURE — 77030018352 HC SHR COAG HARM2 J&J -E: Performed by: OBSTETRICS & GYNECOLOGY

## 2019-07-31 PROCEDURE — 77030018390 HC SPNG HEMSTAT2 J&J -B: Performed by: OBSTETRICS & GYNECOLOGY

## 2019-07-31 PROCEDURE — 88112 CYTOPATH CELL ENHANCE TECH: CPT

## 2019-07-31 PROCEDURE — 74011000250 HC RX REV CODE- 250: Performed by: NURSE ANESTHETIST, CERTIFIED REGISTERED

## 2019-07-31 PROCEDURE — 74011000250 HC RX REV CODE- 250

## 2019-07-31 PROCEDURE — 76210000021 HC REC RM PH II 0.5 TO 1 HR: Performed by: OBSTETRICS & GYNECOLOGY

## 2019-07-31 PROCEDURE — 77030008683 HC TU ET CUF COVD -A: Performed by: ANESTHESIOLOGY

## 2019-07-31 PROCEDURE — 77030018836 HC SOL IRR NACL ICUM -A: Performed by: OBSTETRICS & GYNECOLOGY

## 2019-07-31 PROCEDURE — 76010000153 HC OR TIME 1.5 TO 2 HR: Performed by: OBSTETRICS & GYNECOLOGY

## 2019-07-31 PROCEDURE — 77030031139 HC SUT VCRL2 J&J -A: Performed by: OBSTETRICS & GYNECOLOGY

## 2019-07-31 PROCEDURE — 77030018823 HC SLV COMPR VENO -B: Performed by: OBSTETRICS & GYNECOLOGY

## 2019-07-31 PROCEDURE — 77030035051: Performed by: OBSTETRICS & GYNECOLOGY

## 2019-07-31 PROCEDURE — 77030020255 HC SOL INJ LR 1000ML BG: Performed by: OBSTETRICS & GYNECOLOGY

## 2019-07-31 PROCEDURE — 88307 TISSUE EXAM BY PATHOLOGIST: CPT

## 2019-07-31 PROCEDURE — 77030035220 HC TRCR ENDOSC BLNTPRT ANCHR COVD -B: Performed by: OBSTETRICS & GYNECOLOGY

## 2019-07-31 PROCEDURE — 76210000063 HC OR PH I REC FIRST 0.5 HR: Performed by: OBSTETRICS & GYNECOLOGY

## 2019-07-31 PROCEDURE — 76060000034 HC ANESTHESIA 1.5 TO 2 HR: Performed by: OBSTETRICS & GYNECOLOGY

## 2019-07-31 RX ORDER — PROPOFOL 10 MG/ML
INJECTION, EMULSION INTRAVENOUS AS NEEDED
Status: DISCONTINUED | OUTPATIENT
Start: 2019-07-31 | End: 2019-07-31 | Stop reason: HOSPADM

## 2019-07-31 RX ORDER — ROCURONIUM BROMIDE 10 MG/ML
INJECTION, SOLUTION INTRAVENOUS AS NEEDED
Status: DISCONTINUED | OUTPATIENT
Start: 2019-07-31 | End: 2019-07-31 | Stop reason: HOSPADM

## 2019-07-31 RX ORDER — DEXAMETHASONE SODIUM PHOSPHATE 4 MG/ML
INJECTION, SOLUTION INTRA-ARTICULAR; INTRALESIONAL; INTRAMUSCULAR; INTRAVENOUS; SOFT TISSUE AS NEEDED
Status: DISCONTINUED | OUTPATIENT
Start: 2019-07-31 | End: 2019-07-31 | Stop reason: HOSPADM

## 2019-07-31 RX ORDER — MIDAZOLAM HYDROCHLORIDE 1 MG/ML
INJECTION, SOLUTION INTRAMUSCULAR; INTRAVENOUS AS NEEDED
Status: DISCONTINUED | OUTPATIENT
Start: 2019-07-31 | End: 2019-07-31 | Stop reason: HOSPADM

## 2019-07-31 RX ORDER — ONDANSETRON 2 MG/ML
4 INJECTION INTRAMUSCULAR; INTRAVENOUS ONCE
Status: DISCONTINUED | OUTPATIENT
Start: 2019-07-31 | End: 2019-07-31 | Stop reason: HOSPADM

## 2019-07-31 RX ORDER — NEOSTIGMINE METHYLSULFATE 5 MG/5 ML
SYRINGE (ML) INTRAVENOUS AS NEEDED
Status: DISCONTINUED | OUTPATIENT
Start: 2019-07-31 | End: 2019-07-31 | Stop reason: HOSPADM

## 2019-07-31 RX ORDER — HEPARIN SODIUM 5000 [USP'U]/ML
5000 INJECTION, SOLUTION INTRAVENOUS; SUBCUTANEOUS ONCE
Status: COMPLETED | OUTPATIENT
Start: 2019-07-31 | End: 2019-07-31

## 2019-07-31 RX ORDER — CEFAZOLIN SODIUM 2 G/50ML
2 SOLUTION INTRAVENOUS ONCE
Status: COMPLETED | OUTPATIENT
Start: 2019-07-31 | End: 2019-07-31

## 2019-07-31 RX ORDER — ONDANSETRON 2 MG/ML
INJECTION INTRAMUSCULAR; INTRAVENOUS AS NEEDED
Status: DISCONTINUED | OUTPATIENT
Start: 2019-07-31 | End: 2019-07-31 | Stop reason: HOSPADM

## 2019-07-31 RX ORDER — SODIUM CHLORIDE 0.9 % (FLUSH) 0.9 %
5-40 SYRINGE (ML) INJECTION AS NEEDED
Status: DISCONTINUED | OUTPATIENT
Start: 2019-07-31 | End: 2019-07-31 | Stop reason: HOSPADM

## 2019-07-31 RX ORDER — SODIUM CHLORIDE 0.9 % (FLUSH) 0.9 %
5-40 SYRINGE (ML) INJECTION EVERY 8 HOURS
Status: DISCONTINUED | OUTPATIENT
Start: 2019-07-31 | End: 2019-07-31 | Stop reason: HOSPADM

## 2019-07-31 RX ORDER — FENTANYL CITRATE 50 UG/ML
INJECTION, SOLUTION INTRAMUSCULAR; INTRAVENOUS AS NEEDED
Status: DISCONTINUED | OUTPATIENT
Start: 2019-07-31 | End: 2019-07-31 | Stop reason: HOSPADM

## 2019-07-31 RX ORDER — LIDOCAINE HYDROCHLORIDE 20 MG/ML
INJECTION, SOLUTION EPIDURAL; INFILTRATION; INTRACAUDAL; PERINEURAL AS NEEDED
Status: DISCONTINUED | OUTPATIENT
Start: 2019-07-31 | End: 2019-07-31 | Stop reason: HOSPADM

## 2019-07-31 RX ORDER — HYDROMORPHONE HYDROCHLORIDE 2 MG/ML
INJECTION, SOLUTION INTRAMUSCULAR; INTRAVENOUS; SUBCUTANEOUS AS NEEDED
Status: DISCONTINUED | OUTPATIENT
Start: 2019-07-31 | End: 2019-07-31 | Stop reason: HOSPADM

## 2019-07-31 RX ORDER — KETAMINE HYDROCHLORIDE 50 MG/ML
INJECTION, SOLUTION INTRAMUSCULAR; INTRAVENOUS AS NEEDED
Status: DISCONTINUED | OUTPATIENT
Start: 2019-07-31 | End: 2019-07-31 | Stop reason: HOSPADM

## 2019-07-31 RX ORDER — FENTANYL CITRATE 50 UG/ML
50 INJECTION, SOLUTION INTRAMUSCULAR; INTRAVENOUS
Status: DISCONTINUED | OUTPATIENT
Start: 2019-07-31 | End: 2019-07-31 | Stop reason: HOSPADM

## 2019-07-31 RX ORDER — OXYCODONE AND ACETAMINOPHEN 5; 325 MG/1; MG/1
1 TABLET ORAL
Status: DISCONTINUED | OUTPATIENT
Start: 2019-07-31 | End: 2019-07-31 | Stop reason: HOSPADM

## 2019-07-31 RX ORDER — HYDROMORPHONE HYDROCHLORIDE 2 MG/ML
0.5 INJECTION, SOLUTION INTRAMUSCULAR; INTRAVENOUS; SUBCUTANEOUS
Status: DISCONTINUED | OUTPATIENT
Start: 2019-07-31 | End: 2019-07-31 | Stop reason: HOSPADM

## 2019-07-31 RX ORDER — SODIUM CHLORIDE, SODIUM LACTATE, POTASSIUM CHLORIDE, CALCIUM CHLORIDE 600; 310; 30; 20 MG/100ML; MG/100ML; MG/100ML; MG/100ML
75 INJECTION, SOLUTION INTRAVENOUS CONTINUOUS
Status: DISCONTINUED | OUTPATIENT
Start: 2019-07-31 | End: 2019-07-31 | Stop reason: HOSPADM

## 2019-07-31 RX ORDER — SUCCINYLCHOLINE CHLORIDE 20 MG/ML
INJECTION INTRAMUSCULAR; INTRAVENOUS AS NEEDED
Status: DISCONTINUED | OUTPATIENT
Start: 2019-07-31 | End: 2019-07-31 | Stop reason: HOSPADM

## 2019-07-31 RX ORDER — KETOROLAC TROMETHAMINE 30 MG/ML
INJECTION, SOLUTION INTRAMUSCULAR; INTRAVENOUS AS NEEDED
Status: DISCONTINUED | OUTPATIENT
Start: 2019-07-31 | End: 2019-07-31 | Stop reason: HOSPADM

## 2019-07-31 RX ORDER — OXYCODONE AND ACETAMINOPHEN 5; 325 MG/1; MG/1
1-2 TABLET ORAL
Qty: 40 TAB | Refills: 0 | Status: SHIPPED | OUTPATIENT
Start: 2019-07-31 | End: 2019-08-14

## 2019-07-31 RX ORDER — GLYCOPYRROLATE 0.2 MG/ML
INJECTION INTRAMUSCULAR; INTRAVENOUS AS NEEDED
Status: DISCONTINUED | OUTPATIENT
Start: 2019-07-31 | End: 2019-07-31 | Stop reason: HOSPADM

## 2019-07-31 RX ADMIN — SUCCINYLCHOLINE CHLORIDE 120 MG: 20 INJECTION INTRAMUSCULAR; INTRAVENOUS at 07:30

## 2019-07-31 RX ADMIN — FENTANYL CITRATE 50 MCG: 50 INJECTION, SOLUTION INTRAMUSCULAR; INTRAVENOUS at 07:34

## 2019-07-31 RX ADMIN — ONDANSETRON 4 MG: 2 INJECTION INTRAMUSCULAR; INTRAVENOUS at 08:50

## 2019-07-31 RX ADMIN — GLYCOPYRROLATE 0.6 MG: 0.2 INJECTION INTRAMUSCULAR; INTRAVENOUS at 08:53

## 2019-07-31 RX ADMIN — ROCURONIUM BROMIDE 5 MG: 10 INJECTION, SOLUTION INTRAVENOUS at 08:36

## 2019-07-31 RX ADMIN — KETAMINE HYDROCHLORIDE 25 MG: 50 INJECTION, SOLUTION INTRAMUSCULAR; INTRAVENOUS at 07:32

## 2019-07-31 RX ADMIN — DEXAMETHASONE SODIUM PHOSPHATE 8 MG: 4 INJECTION, SOLUTION INTRA-ARTICULAR; INTRALESIONAL; INTRAMUSCULAR; INTRAVENOUS; SOFT TISSUE at 07:37

## 2019-07-31 RX ADMIN — FAMOTIDINE 20 MG: 10 INJECTION, SOLUTION INTRAVENOUS at 06:26

## 2019-07-31 RX ADMIN — MIDAZOLAM HYDROCHLORIDE 2 MG: 1 INJECTION, SOLUTION INTRAMUSCULAR; INTRAVENOUS at 07:21

## 2019-07-31 RX ADMIN — ROCURONIUM BROMIDE 25 MG: 10 INJECTION, SOLUTION INTRAVENOUS at 07:41

## 2019-07-31 RX ADMIN — FENTANYL CITRATE 50 MCG: 50 INJECTION, SOLUTION INTRAMUSCULAR; INTRAVENOUS at 07:29

## 2019-07-31 RX ADMIN — PROPOFOL 150 MG: 10 INJECTION, EMULSION INTRAVENOUS at 07:29

## 2019-07-31 RX ADMIN — LIDOCAINE HYDROCHLORIDE 80 MG: 20 INJECTION, SOLUTION EPIDURAL; INFILTRATION; INTRACAUDAL; PERINEURAL at 07:29

## 2019-07-31 RX ADMIN — ROCURONIUM BROMIDE 5 MG: 10 INJECTION, SOLUTION INTRAVENOUS at 08:17

## 2019-07-31 RX ADMIN — HYDROMORPHONE HYDROCHLORIDE 0.4 MG: 2 INJECTION, SOLUTION INTRAMUSCULAR; INTRAVENOUS; SUBCUTANEOUS at 09:03

## 2019-07-31 RX ADMIN — SODIUM CHLORIDE, SODIUM LACTATE, POTASSIUM CHLORIDE, AND CALCIUM CHLORIDE: 600; 310; 30; 20 INJECTION, SOLUTION INTRAVENOUS at 07:17

## 2019-07-31 RX ADMIN — CEFAZOLIN 2 G: 10 INJECTION, POWDER, FOR SOLUTION INTRAVENOUS at 07:40

## 2019-07-31 RX ADMIN — SODIUM CHLORIDE, SODIUM LACTATE, POTASSIUM CHLORIDE, AND CALCIUM CHLORIDE 75 ML/HR: 600; 310; 30; 20 INJECTION, SOLUTION INTRAVENOUS at 06:26

## 2019-07-31 RX ADMIN — Medication 3 MG: at 08:53

## 2019-07-31 RX ADMIN — HEPARIN SODIUM 5000 UNITS: 5000 INJECTION, SOLUTION INTRAVENOUS; SUBCUTANEOUS at 06:28

## 2019-07-31 RX ADMIN — KETOROLAC TROMETHAMINE 30 MG: 30 INJECTION, SOLUTION INTRAMUSCULAR; INTRAVENOUS at 08:50

## 2019-07-31 RX ADMIN — ROCURONIUM BROMIDE 5 MG: 10 INJECTION, SOLUTION INTRAVENOUS at 07:29

## 2019-07-31 NOTE — PROGRESS NOTES
conducted a pre-surgery visit with Raman Rachelle, who is a 33 y.o.,female. The  provided the following Interventions:  Initiated a relationship of care and support. Offered prayer and assurance of continued prayers on patient's behalf. Plan:  Chaplains will continue to follow and will provide pastoral care on an as needed/requested basis.  recommends bedside caregivers page  on duty if patient shows signs of acute spiritual or emotional distress.     [address] [facility] Certified [address] [facility]   [phone]

## 2019-07-31 NOTE — ANESTHESIA PREPROCEDURE EVALUATION
Relevant Problems   No relevant active problems       Anesthetic History   No history of anesthetic complications       Comments: No prior GA   Neg family hx     Review of Systems / Medical History  Patient summary reviewed and pertinent labs reviewed    Pulmonary  Within defined limits                 Neuro/Psych   Within defined limits           Cardiovascular  Within defined limits                Exercise tolerance: >4 METS     GI/Hepatic/Renal  Within defined limits              Endo/Other        Anemia     Other Findings   Comments: Ovarian mass  R/O Cancer           Physical Exam    Airway  Mallampati: II  TM Distance: 4 - 6 cm  Neck ROM: normal range of motion   Mouth opening: Normal     Cardiovascular    Rhythm: regular  Rate: normal         Dental  No notable dental hx       Pulmonary  Breath sounds clear to auscultation               Abdominal  GI exam deferred       Other Findings            Anesthetic Plan    ASA: 2  Anesthesia type: general          Induction: Intravenous  Anesthetic plan and risks discussed with: Patient

## 2019-07-31 NOTE — DISCHARGE INSTRUCTIONS
Laparoscopic Oophorectomy: What to Expect at [address] [facility]    After surgery to remove one or both ovaries, you may feel some pain in your belly for a few days. Your belly may also be swollen. You may have a change in your bowel movements for a few days. It's normal to also have some shoulder or back pain. This is caused by the gas your doctor put in your belly to help see your organs better. To help with pain, your doctor will prescribe medicines. You may need about 1 week to fully recover. It's important not to lift anything heavy for about 1 week. You can ask your doctor when it's okay to have sex. This care sheet gives you a general idea about how long it will take for you to recover. But each person recovers at a different pace. Follow the steps below to get better as quickly as possible. How can you care for yourself at home? Activity    · Rest when you feel tired.     · Be active. Walking is a good choice.     · Allow your body to heal. Don't move quickly or lift anything heavy until you are feeling better.     · Hold a pillow over your incisions when you cough or take deep breaths. This will support your belly and may help to decrease your pain.     · Do breathing exercises at home as instructed by your doctor. This will help prevent pneumonia. Diet    · You can eat your normal diet. If your stomach is upset, try bland, low-fat foods like plain rice, broiled chicken, toast, and yogurt.     · Drink plenty of fluids (unless your doctor tells you not to).   · If your bowel movements are not regular right after surgery, try to avoid constipation and straining. Drink plenty of water. Your doctor may suggest fiber, a stool softener, or a mild laxative. Medicines    · Your doctor will tell you if and when you can restart your medicines.  He or she will also give you instructions about taking any new medicines.     · If you take blood thinners, such as warfarin (Coumadin), clopidogrel (Plavix), or aspirin, be sure to talk to your doctor. He or she will tell you if and when to start taking those medicines again. Make sure that you understand exactly what your doctor wants you to do.     · Be safe with medicines. Read and follow all instructions on the label. ? If the doctor gave you a prescription medicine for pain, take it as prescribed. ? If you are not taking a prescription pain medicine, ask your doctor if you can take an over-the-counter medicine. Incision care    · If you have strips of tape on the cut (incision) the doctor made, leave the tape on for a week or until it falls off.     · Wash the area daily with warm, soapy water, and pat it dry. Don't use hydrogen peroxide or alcohol. They can slow healing.     · You may cover the area with a gauze bandage if it oozes fluid or rubs against clothing.     · Change the bandage every day.     · Keep the area clean and dry. Other instructions    · Wear loose, comfortable clothing. For a few weeks, avoid anything that puts pressure on your belly.     · You may want to use a heating pad on your belly to help with pain. Follow-up care is a key part of your treatment and safety. Be sure to make and go to all appointments, and call your doctor if you are having problems. It's also a good idea to know your test results and keep a list of the medicines you take. When should you call for help? Call 911 anytime you think you may need emergency care.  For example, call if:    · You passed out (lost consciousness).     · You have chest pain, are short of breath, or cough up blood.    Call your doctor now or seek immediate medical care if:    · You have pain that does not get better after you take pain medicine.     · You cannot pass stools or gas.     · You have vaginal discharge that has increased in amount or smells bad.     · You are sick to your stomach or cannot drink fluids.     · You have loose stitches, or your incision comes open.     · Bright red blood has soaked through the bandage over your incision.     · You have signs of infection, such as:  ? Increased pain, swelling, warmth, or redness. ? Red streaks leading from the incision. ? Pus draining from the incision. ? A fever.     · You have bright red vaginal bleeding that soaks one or more pads in an hour, or you have large clots.     · You have signs of a blood clot in your leg (called a deep vein thrombosis), such as:  ? Pain in your calf, back of the knee, thigh, or groin. ? Redness and swelling in your leg.    Watch closely for changes in your health, and be sure to contact your doctor if you have any problems. Where can you learn more? Go to http://almita-kassi.info/. Enter P000 in the search box to learn more about \"Laparoscopic Oophorectomy: What to Expect at Home. \"  Current as of: February 19, 2019  Content Version: 12.1  © 6966-7526 [employer]. Care instructions adapted under license by [employer] (which disclaims liability or warranty for this information). If you have questions about a medical condition or this instruction, always ask your healthcare professional. [place] [address] any warranty or liability for your use of this information. DISCHARGE SUMMARY from Nurse    PATIENT INSTRUCTIONS:    After general anesthesia or intravenous sedation, for 24 hours or while taking prescription Narcotics:  · Limit your activities  · Do not drive and operate hazardous machinery  · Do not make important personal or business decisions  · Do  not drink alcoholic beverages  · If you have not urinated within 8 hours after discharge, please contact your surgeon on call.     Report the following to your surgeon:  · Excessive pain, swelling, redness or odor of or around the surgical area  · Temperature over 100.5  · Nausea and vomiting lasting longer than 4 hours or if unable to take medications  · Any signs of decreased circulation or nerve impairment to extremity: change in color, persistent  numbness, tingling, coldness or increase pain  · Any questions    *  Please give a list of your current medications to your Primary Care Provider. *  Please update this list whenever your medications are discontinued, doses are      changed, or new medications (including over-the-counter products) are added. *  Please carry medication information at all times in case of emergency situations. These are general instructions for a healthy lifestyle:    No smoking/ No tobacco products/ Avoid exposure to second hand smoke  Surgeon General's Warning:  Quitting smoking now greatly reduces serious risk to your health. Obesity, smoking, and sedentary lifestyle greatly increases your risk for illness    A healthy diet, regular physical exercise & weight monitoring are important for maintaining a healthy lifestyle    You may be retaining fluid if you have a history of heart failure or if you experience any of the following symptoms:  Weight gain of 3 pounds or more overnight or 5 pounds in a week, increased swelling in our hands or feet or shortness of breath while lying flat in bed. Please call your doctor as soon as you notice any of these symptoms; do not wait until your next office visit. The discharge information has been reviewed with the patient and parent. The patient and parent verbalized understanding. Discharge medications reviewed with the patient and appropriate educational materials and side effects teaching were provided.   ___________________________________________________________________________________________________________________________________

## 2019-07-31 NOTE — BRIEF OP NOTE
BRIEF OPERATIVE NOTE    Date of Procedure: 7/31/2019   Preoperative Diagnosis: R19.00 PELVIC MASS  R97.1 ELEVATED CA-125  Postoperative Diagnosis: R19.00 PELVIC MASS  R97.1 ELEVATED CA-125    Procedure(s):  LAPAROSCOPIC LEFT SALPINGO OOPHORECTOMY/RESECTION OF PELVIC MASS/lysis of adhesions >60 minutes  Surgeon(s) and Role:     * Umair Roy MD - Primary         Surgical Assistant: Myles Kearns    Surgical Staff:  Circ-1: Lavinia Thomas RN  Scrub Tech-1: Joon Stevens  Scrub Tech-2: Gilda Flank  Surg Asst-1: Rosa Slice  Event Time In Time Out   Incision Start 9156    Incision Close       Anesthesia: General   Estimated Blood Loss: 150  Specimens:   ID Type Source Tests Collected by Time Destination   1 : Left Fallopian Tube and Ovary Preservative Ovary  Umair Roy MD 7/31/2019 3288 Pathology   1 : Pelvic Washings for cytology Fresh Pelvis  Umair Roy MD 7/31/2019 0813 Cytology      Findings: 10-12 left sided endometrioma arising from left adnexa adhered to uterus, left pelvic sidewall including ureter.  3 cm right ovarian cyst.    Complications: none  Implants: * No implants in log *

## 2019-07-31 NOTE — ANESTHESIA POSTPROCEDURE EVALUATION
Procedure(s):  LAPAROSCOPIC LEFT SALPINGO OOPHORECTOMY/RESECTION OF PELVIC MASS/POSSIBLE STAGING. general    Anesthesia Post Evaluation      Multimodal analgesia: multimodal analgesia used between 6 hours prior to anesthesia start to PACU discharge  Patient location during evaluation: bedside  Patient participation: complete - patient participated  Level of consciousness: awake  Pain management: adequate  Airway patency: patent  Anesthetic complications: no  Cardiovascular status: acceptable  Respiratory status: acceptable  Hydration status: acceptable  Post anesthesia nausea and vomiting:  controlled      Vitals Value Taken Time   /79 7/31/2019  9:45 AM   Temp 37.3 °C (99.1 °F) 7/31/2019  9:21 AM   Pulse 73 7/31/2019  9:48 AM   Resp 15 7/31/2019  9:48 AM   SpO2 100 % 7/31/2019  9:48 AM   Vitals shown include unvalidated device data.

## 2019-07-31 NOTE — OP NOTES
[address] [place] [address]   OPERATIVE REPORT    Name:  Aleah Perry  MR#:   [id number]  :  1990  ACCOUNT #:  [de-identified]  DATE OF SERVICE:  2019    PREOPERATIVE DIAGNOSIS:  Pelvic mass, elevated CA-125. POSTOPERATIVE DIAGNOSIS:  Pelvic mass, elevated CA-125    PROCEDURE PERFORMED:  Laparoscopic resection of pelvic mass including left salpingo-oophorectomy with extensive lysis of adhesions for greater than 60 minutes. SURGEON:  Elizabeth Wells DO    ASSISTANT:  Tapan Dumont    ASSISTANT TASKS:  Retraction, closing. ANESTHESIA:  General.    FLUIDS:  1000 mL crystalloid. URINE OUTPUT:  175, clear urine. COMPLICATIONS:  None. SPECIMENS REMOVED:  Left tube and ovary and washing. IMPLANTS:  none. ESTIMATED BLOOD LOSS:  150 mL. FINDINGS:  Laparoscopic findings revealed a 10-12 cm mass arising from the left adnexa consistent with endometrioma, densely adhered to the pelvic sidewall, left uterosacral ligament and uterus as well as the ureter. Within the right adnexa, she had a 3-4 cm cyst.  There were quite a bit of adhesions in the posterior cul-de-sac. Otherwise, all peritoneal surfaces were smooth. INDICATIONS:  The patient is a 24-year-old who had an ultrasound in January revealing 9 cm left-sided cystic mass. This was apparent on ultrasound in 2017. Her CA-125 was elevated at 103.9. She presents today for definitive surgical management. PROCEDURE:  The patient was taken to the operating room where general anesthesia was obtained without difficulty. She was placed in the dorsal lithotomy position, prepped and draped in the normal sterile fashion. A surgical timeout was obtained by the entire surgical team.  A Chen catheter was placed. A sterile speculum was then placed in the patient's vagina. Anterior lip of the cervix was grasped with a single-tooth tenaculum. It was difficult to dilate the cervix.   Therefore, a small dilator was left in for manipulation. Attention was then turned to the abdomen where an incision was made above the umbilicus and carried down to the underlying fascia. The fascia was then grasped with Kocher clamps and incised using Whatley scissors. A 0 Vicryl stay suture was placed. Intra-abdominal entry was obtained using blunt dissection. The Stephany trocar was advanced, and pneumoperitoneum was obtained to 15 mmHg. At this point, the patient was placed in Trendelenburg with findings as above. Two 5-mm trocars were placed in the left side under direct visualization and one 5-mm trocar was placed in the right lower quadrant under direct visualization. Washings were obtained. Attention was then turned to the left pelvic sidewall where the peritoneal wall and the psoas muscles were elevated and incised. The peritoneal excision was extended superiorly and inferiorly along the IP ligament. The left retroperitoneal space was developed. The ureter was identified. A window in the peritoneum above the ureter was made, and the left IP ligament was skeletonized, sealed, and divided in an attempt to elevate the mass from the posterior cul-de-sac. The mass was ruptured with chocolate cyst fluid consistent with endometrioma. At this point, it was apparent that the mass was quite adhered to the pelvic sidewall, uterosacral ligament, and ureter; therefore, ureterolysis was performed on the left side to ensure that the ureter was deep and lateral to the remainder of our dissection. Attention was then turned anteriorly where the left fallopian tube was divided at the fundus. At this point, attention was turned posteriorly where further lysis of adhesions was performed to mobilize the mass off of the pelvic sidewall as well as from the posterior cul-de-sac. This dissection was carried along the uterosacral ligament.   This allowed us to better mobilize the mass anteriorly with medium retraction ensuring that the ureter was deep to our dissection. Its attachments to the pelvic sidewall were further taken down using the harmonic scalpel. All this lysis of adhesions took greater than 60 minutes. At this point, we were able to divide the utero-ovarian ligament at the uterus and the mass was placed in the upper abdomen. The abdomen and pelvis were irrigated copiously. At this point, a cyst was noted on the right, which was quite adhered to the pelvic sidewall and the uterus. Some of these adhesions were taken down using the harmonic scalpel. The cyst was then drained, and small piece of Surgicel was placed over where there was some oozing, which offered good hemostasis. At this point, the specimen was placed in an Endo Catch bag, removed, and sent for permanent specimen. The abdomen and pelvis were then irrigated copiously. At this point, a bubble test was performed from below to ensure there was no injury to the rectum, which was negative. Next, all trocars were removed and pneumoperitoneum was released. The supraumbilical fascial incision was reapproximated with 0 Vicryl figure-of-eight stitch, and all skin was closed with 4-0 Monocryl and Dermabond. Exparel was injected at the conclusion. The patient tolerated the procedure well. Sponge, needle, and instrument counts were correct x2 and the patient was taken to the recovery room.       Sebastian Yoder DO [email or web address]  D:  07/31/2019 9:05  T:  07/31/2019 11:35  JOB #:  [id number]

## 2019-07-31 NOTE — INTERVAL H&P NOTE
H&P Update: 
Pete Kiran was seen and examined. History and physical has been reviewed. The patient has been examined.  There have been no significant clinical changes since the completion of the originally dated History and Physical.

## 2019-08-12 NOTE — PROGRESS NOTES
[facility]  [address] [place] [address], [address], [address] [id number] [address]  [place], [address]   [phone]  Ely Vlad DO      Postoperative Office Note  Patient ID:  Name: Gerry Clements  MRM: [id number]  : 1990/28 y.o. Date: 2019    SUBJECTIVE:    This is a 33 y.o.  female who presents s/p Laparoscopic resection of pelvic mass including left salpingo-oophorectomy with extensive lysis of adhesions for greater than 60 minutes. on 2019  Currently she has no problems with eating, bowel movements, voiding, or their wound  Appetite is good. Eating a regular diet without difficulty. Urinating without difficulty. Bowel movements are constipated. The patient is not having any pain. .    Her pathology revealed      LEFT OVARY AND FALLOPIAN TUBE:   ENDOMETRIOTIC CYST OF OVARY       Medications:     Current Outpatient Medications on File Prior to Visit   Medication Sig Dispense Refill    acetaminophen (TYLENOL PO) Take  by mouth as needed.  oxyCODONE-acetaminophen (PERCOCET) 5-325 mg per tablet Take 1-2 Tabs by mouth every four (4) hours as needed for Pain for up to 14 days. Max Daily Amount: 12 Tabs. 40 Tab 0    naproxen sodium (ALEVE) 220 mg cap Take  by mouth as needed. No current facility-administered medications on file prior to visit.         Allergies:   No Known Allergies    OBJECTIVE:    Vitals:   Visit Vitals  /73 (BP 1 Location: Left arm, BP Patient Position: Sitting)   Pulse (!) 108   Temp 97.3 °F (36.3 °C) (Oral)   Resp 16   Ht 5' 5\" (1.651 m)   Wt 55.2 kg (121 lb 11.2 oz)   LMP 2019 (Within Weeks)   SpO2 100%   BMI 20.25 kg/m²       Physical Examination:    General:  alert, cooperative, no distress   Abdomen: soft, bowel sounds active, non-tender   Incision: healing well   Pelvic: deferred   Rectal: not done   Extremity:   extremities normal, atraumatic, no cyanosis or edema     IMPRESSION/PLAN:    Abdoul Lentz is Doing well postoperatively. .  She has a working diagnosis of endometriosis. The operative procedures and clinical results have been reviewed with the patient. Implications of diagnosis discussed at length. All questions answered. Pt will f/u with primary gyn  I will see the patient back prn. The patient is advised to call our office with any problems or concerns.     Nancie Suarez DO  Gynecologic Oncology  8/14/2019/7:41 AM

## 2019-08-14 ENCOUNTER — OFFICE VISIT (OUTPATIENT)
Dept: ONCOLOGY | Age: 29
End: 2019-08-14

## 2019-08-14 VITALS
SYSTOLIC BLOOD PRESSURE: 112 MMHG | BODY MASS INDEX: 20.28 KG/M2 | RESPIRATION RATE: 16 BRPM | HEIGHT: 65 IN | OXYGEN SATURATION: 100 % | DIASTOLIC BLOOD PRESSURE: 73 MMHG | TEMPERATURE: 97.3 F | HEART RATE: 108 BPM | WEIGHT: 121.7 LBS

## 2019-08-14 DIAGNOSIS — N80.9 ENDOMETRIOSIS: Primary | ICD-10-CM

## 2019-08-14 NOTE — Clinical Note
8/14/19 Patient: Vera Jacinto YOB: 1990 Date of Visit: 8/14/2019 JOSEPHINE Rose 
VIA Dear JOSEPHINE Rose, Thank you for referring Ms. Rach Monzon to [facility] for evaluation. My notes for this consultation are attached. If you have questions, please do not hesitate to call me. I look forward to following your patient along with you. Sincerely, Alisha Morrissey MD

## 2019-08-14 NOTE — PATIENT INSTRUCTIONS
Laparoscopic Oophorectomy: What to Expect at [facility]    After surgery to remove one or both ovaries, you may feel some pain in your belly for a few days. Your belly may also be swollen. You may have a change in your bowel movements for a few days. It's normal to also have some shoulder or back pain. This is caused by the gas your doctor put in your belly to help see your organs better. To help with pain, your doctor will prescribe medicines. You may need about 1 week to fully recover. It's important not to lift anything heavy for about 1 week. You can ask your doctor when it's okay to have sex. This care sheet gives you a general idea about how long it will take for you to recover. But each person recovers at a different pace. Follow the steps below to get better as quickly as possible. How can you care for yourself at home? Activity    · Rest when you feel tired.     · Be active. Walking is a good choice.     · Allow your body to heal. Don't move quickly or lift anything heavy until you are feeling better.     · Hold a pillow over your incisions when you cough or take deep breaths. This will support your belly and may help to decrease your pain.     · Do breathing exercises at home as instructed by your doctor. This will help prevent pneumonia. Diet    · You can eat your normal diet. If your stomach is upset, try bland, low-fat foods like plain rice, broiled chicken, toast, and yogurt.     · Drink plenty of fluids (unless your doctor tells you not to).   · If your bowel movements are not regular right after surgery, try to avoid constipation and straining. Drink plenty of water. Your doctor may suggest fiber, a stool softener, or a mild laxative. Medicines    · Your doctor will tell you if and when you can restart your medicines.  He or she will also give you instructions about taking any new medicines.     · If you take blood thinners, such as warfarin (Coumadin), clopidogrel (Plavix), or aspirin, be sure to talk to your doctor. He or she will tell you if and when to start taking those medicines again. Make sure that you understand exactly what your doctor wants you to do.     · Be safe with medicines. Read and follow all instructions on the label. ? If the doctor gave you a prescription medicine for pain, take it as prescribed. ? If you are not taking a prescription pain medicine, ask your doctor if you can take an over-the-counter medicine. Incision care    · If you have strips of tape on the cut (incision) the doctor made, leave the tape on for a week or until it falls off.     · Wash the area daily with warm, soapy water, and pat it dry. Don't use hydrogen peroxide or alcohol. They can slow healing.     · You may cover the area with a gauze bandage if it oozes fluid or rubs against clothing.     · Change the bandage every day.     · Keep the area clean and dry. Other instructions    · Wear loose, comfortable clothing. For a few weeks, avoid anything that puts pressure on your belly.     · You may want to use a heating pad on your belly to help with pain. Follow-up care is a key part of your treatment and safety. Be sure to make and go to all appointments, and call your doctor if you are having problems. It's also a good idea to know your test results and keep a list of the medicines you take. When should you call for help? Call 911 anytime you think you may need emergency care.  For example, call if:    · You passed out (lost consciousness).     · You have chest pain, are short of breath, or cough up blood.    Call your doctor now or seek immediate medical care if:    · You have pain that does not get better after you take pain medicine.     · You cannot pass stools or gas.     · You have vaginal discharge that has increased in amount or smells bad.     · You are sick to your stomach or cannot drink fluids.     · You have loose stitches, or your incision comes open.     · Bright red blood has soaked through the bandage over your incision.     · You have signs of infection, such as:  ? Increased pain, swelling, warmth, or redness. ? Red streaks leading from the incision. ? Pus draining from the incision. ? A fever.     · You have bright red vaginal bleeding that soaks one or more pads in an hour, or you have large clots.     · You have signs of a blood clot in your leg (called a deep vein thrombosis), such as:  ? Pain in your calf, back of the knee, thigh, or groin. ? Redness and swelling in your leg.    Watch closely for changes in your health, and be sure to contact your doctor if you have any problems. Where can you learn more? Go to http://almita-kassi.info/. Enter L234 in the search box to learn more about \"Laparoscopic Oophorectomy: What to Expect at Home. \"  Current as of: February 19, 2019  Content Version: 12.1  © 1944-1335 [employer]. Care instructions adapted under license by [employer] (which disclaims liability or warranty for this information). If you have questions about a medical condition or this instruction, always ask your healthcare professional. [address] any warranty or liability for your use of this information.

## 2019-08-14 NOTE — PROGRESS NOTES
Sosa Sutherladn, a 33 y.o. female,  is here for   Chief Complaint   Patient presents with    Surgical Follow-up     2 week post op LSO and pelvic mass resection       Visit Vitals  /73 (BP 1 Location: Left arm, BP Patient Position: Sitting)   Pulse (!) 108   Temp 97.3 °F (36.3 °C) (Oral)   Resp 16   Ht 5' 5\" (1.651 m)   Wt 55.2 kg (121 lb 11.2 oz)   SpO2 100%   BMI 20.25 kg/m²       Patient reports a history of constipation, last bowel movement was this morning, small amount of stool. Patient denies any persistent or worsening abdominal or pelvic pain. Denies any unusual vaginal bleeding, discharge, irritation, or odor. No burning, discomfort, or irritation with urination. 1. Have you been to the ER, urgent care clinic since your last visit? Hospitalized since your last visit? No    2. Have you seen or consulted any other health care providers outside of the [address] [place] [address] since your last visit? Include any pap smears or colon screening.  No

## 2020-02-19 ENCOUNTER — APPOINTMENT (OUTPATIENT)
Dept: CT IMAGING | Age: 30
End: 2020-02-19
Attending: PHYSICIAN ASSISTANT
Payer: MEDICAID

## 2020-02-19 ENCOUNTER — APPOINTMENT (OUTPATIENT)
Dept: ULTRASOUND IMAGING | Age: 30
End: 2020-02-19
Attending: EMERGENCY MEDICINE
Payer: MEDICAID

## 2020-02-19 ENCOUNTER — HOSPITAL ENCOUNTER (EMERGENCY)
Age: 30
Discharge: HOME | End: 2020-02-20
Attending: EMERGENCY MEDICINE
Payer: MEDICAID

## 2020-02-19 DIAGNOSIS — B96.89 BV (BACTERIAL VAGINOSIS): Primary | ICD-10-CM

## 2020-02-19 DIAGNOSIS — N83.201 CYST OF RIGHT OVARY: ICD-10-CM

## 2020-02-19 DIAGNOSIS — N76.0 BV (BACTERIAL VAGINOSIS): Primary | ICD-10-CM

## 2020-02-19 LAB
ALBUMIN SERPL-MCNC: 4.2 G/DL (ref 3.4–5)
ALBUMIN/GLOB SERPL: 0.9 {RATIO} (ref 0.8–1.7)
ALP SERPL-CCNC: 57 U/L (ref 45–117)
ALT SERPL-CCNC: 10 U/L (ref 13–56)
ANION GAP SERPL CALC-SCNC: 5 MMOL/L (ref 3–18)
APPEARANCE UR: CLEAR
AST SERPL-CCNC: 10 U/L (ref 10–38)
BASOPHILS # BLD: 0 K/UL (ref 0–0.1)
BASOPHILS NFR BLD: 0 % (ref 0–2)
BILIRUB SERPL-MCNC: 0.4 MG/DL (ref 0.2–1)
BILIRUB UR QL: NEGATIVE
BUN SERPL-MCNC: 5 MG/DL (ref 7–18)
BUN/CREAT SERPL: 7 (ref 12–20)
CALCIUM SERPL-MCNC: 9.5 MG/DL (ref 8.5–10.1)
CHLORIDE SERPL-SCNC: 105 MMOL/L (ref 100–111)
CO2 SERPL-SCNC: 27 MMOL/L (ref 21–32)
COLOR UR: YELLOW
CREAT SERPL-MCNC: 0.69 MG/DL (ref 0.6–1.3)
DIFFERENTIAL METHOD BLD: NORMAL
EOSINOPHIL # BLD: 0 K/UL (ref 0–0.4)
EOSINOPHIL NFR BLD: 0 % (ref 0–5)
EPITH CASTS URNS QL MICRO: NORMAL /LPF (ref 0–5)
ERYTHROCYTE [DISTWIDTH] IN BLOOD BY AUTOMATED COUNT: 13 % (ref 11.6–14.5)
GLOBULIN SER CALC-MCNC: 4.7 G/DL (ref 2–4)
GLUCOSE SERPL-MCNC: 102 MG/DL (ref 74–99)
GLUCOSE UR STRIP.AUTO-MCNC: NEGATIVE MG/DL
HCG UR QL: NEGATIVE
HCT VFR BLD AUTO: 39.7 % (ref 35–45)
HGB BLD-MCNC: 12.8 G/DL (ref 12–16)
HGB UR QL STRIP: ABNORMAL
KETONES UR QL STRIP.AUTO: 15 MG/DL
LEUKOCYTE ESTERASE UR QL STRIP.AUTO: NEGATIVE
LIPASE SERPL-CCNC: 80 U/L (ref 73–393)
LYMPHOCYTES # BLD: 1.6 K/UL (ref 0.9–3.6)
LYMPHOCYTES NFR BLD: 25 % (ref 21–52)
MCH RBC QN AUTO: 28.3 PG (ref 24–34)
MCHC RBC AUTO-ENTMCNC: 32.2 G/DL (ref 31–37)
MCV RBC AUTO: 87.8 FL (ref 74–97)
MONOCYTES # BLD: 0.6 K/UL (ref 0.05–1.2)
MONOCYTES NFR BLD: 10 % (ref 3–10)
NEUTS SEG # BLD: 4.3 K/UL (ref 1.8–8)
NEUTS SEG NFR BLD: 65 % (ref 40–73)
NITRITE UR QL STRIP.AUTO: NEGATIVE
PH UR STRIP: 6.5 [PH] (ref 5–8)
PLATELET # BLD AUTO: 363 K/UL (ref 135–420)
PMV BLD AUTO: 9.3 FL (ref 9.2–11.8)
POTASSIUM SERPL-SCNC: 3.8 MMOL/L (ref 3.5–5.5)
PROT SERPL-MCNC: 8.9 G/DL (ref 6.4–8.2)
PROT UR STRIP-MCNC: NEGATIVE MG/DL
RBC # BLD AUTO: 4.52 M/UL (ref 4.2–5.3)
RBC #/AREA URNS HPF: NORMAL /HPF (ref 0–5)
SERVICE CMNT-IMP: NORMAL
SODIUM SERPL-SCNC: 137 MMOL/L (ref 136–145)
SP GR UR REFRACTOMETRY: 1.02 (ref 1–1.03)
UROBILINOGEN UR QL STRIP.AUTO: 1 EU/DL (ref 0.2–1)
WBC # BLD AUTO: 6.5 K/UL (ref 4.6–13.2)
WBC URNS QL MICRO: NORMAL /HPF (ref 0–4)
WET PREP GENITAL: NORMAL

## 2020-02-19 PROCEDURE — 83690 ASSAY OF LIPASE: CPT

## 2020-02-19 PROCEDURE — 96375 TX/PRO/DX INJ NEW DRUG ADDON: CPT

## 2020-02-19 PROCEDURE — 74011250636 HC RX REV CODE- 250/636: Performed by: PHYSICIAN ASSISTANT

## 2020-02-19 PROCEDURE — 85025 COMPLETE CBC W/AUTO DIFF WBC: CPT

## 2020-02-19 PROCEDURE — 81001 URINALYSIS AUTO W/SCOPE: CPT

## 2020-02-19 PROCEDURE — 76830 TRANSVAGINAL US NON-OB: CPT

## 2020-02-19 PROCEDURE — 74011636320 HC RX REV CODE- 636/320: Performed by: EMERGENCY MEDICINE

## 2020-02-19 PROCEDURE — 81025 URINE PREGNANCY TEST: CPT

## 2020-02-19 PROCEDURE — 74177 CT ABD & PELVIS W/CONTRAST: CPT

## 2020-02-19 PROCEDURE — 96374 THER/PROPH/DIAG INJ IV PUSH: CPT

## 2020-02-19 PROCEDURE — 96361 HYDRATE IV INFUSION ADD-ON: CPT

## 2020-02-19 PROCEDURE — 87491 CHLMYD TRACH DNA AMP PROBE: CPT

## 2020-02-19 PROCEDURE — 99284 EMERGENCY DEPT VISIT MOD MDM: CPT

## 2020-02-19 PROCEDURE — 80053 COMPREHEN METABOLIC PANEL: CPT

## 2020-02-19 PROCEDURE — 87210 SMEAR WET MOUNT SALINE/INK: CPT

## 2020-02-19 RX ORDER — METHYLPREDNISOLONE 4 MG/1
TABLET ORAL
COMMUNITY

## 2020-02-19 RX ORDER — KETOROLAC TROMETHAMINE 15 MG/ML
15 INJECTION, SOLUTION INTRAMUSCULAR; INTRAVENOUS
Status: COMPLETED | OUTPATIENT
Start: 2020-02-19 | End: 2020-02-19

## 2020-02-19 RX ORDER — ONDANSETRON 2 MG/ML
4 INJECTION INTRAMUSCULAR; INTRAVENOUS
Status: COMPLETED | OUTPATIENT
Start: 2020-02-19 | End: 2020-02-19

## 2020-02-19 RX ADMIN — SODIUM CHLORIDE 1000 ML: 900 INJECTION, SOLUTION INTRAVENOUS at 18:54

## 2020-02-19 RX ADMIN — IOPAMIDOL 80 ML: 612 INJECTION, SOLUTION INTRAVENOUS at 19:48

## 2020-02-19 RX ADMIN — KETOROLAC TROMETHAMINE 15 MG: 15 INJECTION, SOLUTION INTRAMUSCULAR; INTRAVENOUS at 18:54

## 2020-02-19 RX ADMIN — ONDANSETRON 4 MG: 2 INJECTION INTRAMUSCULAR; INTRAVENOUS at 18:55

## 2020-02-19 NOTE — LETTER
[address] [place] [address] [facility] 
[address] [facility] [address] [phone] Work/School Note Date: 2/19/2020 To Whom It May concern: 
 
Carolyn Arzola was seen and treated today in the emergency room by the following provider(s): 
Attending Provider: Abiola Atkins MD. Carolyn Arzola may return to work on 02/22/2020. Sincerely, 
 
 
 
 
/jason

## 2020-02-19 NOTE — ED NOTES
I performed a brief evaluation, including history and physical, of the patient here in triage and I have determined that pt will need further treatment and evaluation from the main side ER physician. I have placed initial orders to help in expediting patients care.      February 19, 2020 at 5:51 PM - Ragini Wilkisn PA-C      Rt sided low back pain and abd pain, vomiting, decreased appetite    Visit Vitals  /75 (BP 1 Location: Left arm, BP Patient Position: At rest)   Pulse 98   Temp 98.7 °F (37.1 °C)   Resp 16   Ht 5' 5\" (1.651 m)   Wt 58.5 kg (129 lb)   SpO2 99%   BMI 21.47 kg/m²

## 2020-02-19 NOTE — ED TRIAGE NOTES
C/o pain right lower back pain that radiates to right lower abdomen since January 29th. She states vomiting began on 2/11/20. She states decreased appetite. Denies urinary symptoms.

## 2020-02-19 NOTE — ED PROVIDER NOTES
EMERGENCY DEPARTMENT HISTORY AND PHYSICAL EXAM      Date: 2/19/2020  Patient Name: Reyna Adam    History of Presenting Illness     Chief Complaint   Patient presents with    Back Pain    Vomiting       History Provided By: Patient    HPI: Reyna Adam, 26 y.o. female PMHx significant for ovarian cyst, asthma, endometriosis presents ambulatory to the ED with cc of sharp intermittent pain to right flank, right lower back and RLQ x 1 mo. patient states she was seen at [facility] and diagnosed with ovarian cyst.  Patient reports continued pain that increased today. Patient also reports increased nausea and vomiting with decreased appetite over the past few weeks. Denies diarrhea. Patient previously had left salpingo-oophorectomy 7/2019 with diagnosis of endometriosis. Pt f/u with Dr. Melissa Griggs. Patient denies change in vaginal discharge, dysuria, hematuria. LMP: 2/2. There are no other complaints, changes, or physical findings at this time. PCP: JOSEPHINE Johnson    No current facility-administered medications on file prior to encounter. Current Outpatient Medications on File Prior to Encounter   Medication Sig Dispense Refill    methylPREDNISolone (MEDROL, MYRA,) 4 mg tablet Take  by mouth Specific Days and Specific Times.  acetaminophen (TYLENOL PO) Take  by mouth as needed.  naproxen sodium (ALEVE) 220 mg cap Take  by mouth as needed.          Past History     Past Medical History:  Past Medical History:   Diagnosis Date    Asthma     childhood    Other ill-defined conditions(799.89)     Vaginal birth 2012    Ovarian cyst        Past Surgical History:  Past Surgical History:   Procedure Laterality Date    HX LEFT SALPINGO-OOPHORECTOMY  07/31/2019    w/ pelvic mass resection       Family History:  Family History   Problem Relation Age of Onset    Breast Cancer Mother     Diabetes Mother     Cancer Maternal Aunt     Cancer Maternal Grandmother     Cancer Maternal Grandfather     Prostate Cancer Paternal Grandfather     Diabetes Paternal Grandfather        Social History:  Social History     Tobacco Use    Smoking status: Never Smoker    Smokeless tobacco: Never Used   Substance Use Topics    Alcohol use: No    Drug use: No       Allergies:  No Known Allergies      Review of Systems   Review of Systems   Constitutional: Positive for appetite change (decreased). Negative for chills and fever. Respiratory: Negative for shortness of breath. Cardiovascular: Negative for chest pain. Gastrointestinal: Positive for abdominal pain, nausea and vomiting. Genitourinary: Positive for flank pain. Negative for pelvic pain. Musculoskeletal: Negative for back pain and myalgias. Skin: Negative for color change, pallor, rash and wound. Neurological: Negative for dizziness, weakness and light-headedness. All other systems reviewed and are negative. Physical Exam   Physical Exam  Vitals signs and nursing note reviewed. Constitutional:       General: She is not in acute distress. Appearance: She is well-developed. Comments: Pt in pain in NAD   HENT:      Head: Normocephalic and atraumatic. Eyes:      Conjunctiva/sclera: Conjunctivae normal.   Cardiovascular:      Rate and Rhythm: Normal rate and regular rhythm. Heart sounds: Normal heart sounds. Pulmonary:      Effort: Pulmonary effort is normal. No respiratory distress. Breath sounds: Normal breath sounds. Comments: Lungs CTA  Abdominal:      General: Bowel sounds are normal. There is no distension. Palpations: Abdomen is soft. Tenderness: There is abdominal tenderness in the right lower quadrant. Comments: No CVA tenderness  Mild guarding, no rigidity  Abdomen nondistended   Genitourinary:     Cervix: Discharge (moderate amount of white discharge, no odor) present. No cervical motion tenderness, friability, lesion, erythema, cervical bleeding or eversion.       Uterus: Not tender. Adnexa:         Right: No tenderness. Left: No tenderness. Comments: No CMT  Cervical os closed  Musculoskeletal: Normal range of motion. Skin:     General: Skin is warm. Findings: No rash. Neurological:      Mental Status: She is alert and oriented to person, place, and time. Psychiatric:         Behavior: Behavior normal.         Diagnostic Study Results     Labs -   No results found for this or any previous visit (from the past 12 hour(s)). Radiologic Studies -   US TRANSVAGINAL W DOPPLER   Final Result   IMPRESSION:      Large complex right adnexal/ovarian cystic mass. There is a larger cystic   component which contains debris and a heterogeneous mural nodular component   demonstrating lacelike reticular signal. The reticular signal is suggestive of a   hemorrhagic cyst. The ovary is not distinguishable from this mass. No free fluid. Small uterine fibroid. CT ABD PELV W CONT   Final Result   IMPRESSION:      Up to 8.7 cm complex right adnexal structure with regions of water and greater   than water density and inflammatory changes in the pelvis. This may represent   endometrioma/endometriosis, ruptured hemorrhagic cyst, torsion and/or PID with   tubo-ovarian abscess. Consider pelvic ultrasound as follow-up, though it may be   unable to clearly distinguish between the differential but could at least   confirm ovarian blood flow. CT Results  (Last 48 hours)               02/19/20 1948  CT ABD PELV W CONT Final result    Impression:  IMPRESSION:       Up to 8.7 cm complex right adnexal structure with regions of water and greater   than water density and inflammatory changes in the pelvis. This may represent   endometrioma/endometriosis, ruptured hemorrhagic cyst, torsion and/or PID with   tubo-ovarian abscess.  Consider pelvic ultrasound as follow-up, though it may be   unable to clearly distinguish between the differential but could at least confirm ovarian blood flow. Narrative:  CT ABDOMEN AND PELVIS WITH ENHANCEMENT       INDICATION: History of ovarian cyst, endometriosis with sharp intermittent right   flank and right lower quadrant pain for one month, increasing nausea and   vomiting, decreased appetite over several weeks, history of left   salpingo-oophorectomy July 2019. TECHNIQUE: Axial images obtained of the abdomen and pelvis following the   uneventful administration of  80 cc's Isovue-300 nonionic intravenous contrast.    Coronal and sagittal reformatted images of the abdomen and pelvis were obtained. All CT scans at this facility are performed using dose optimization technique as   appropriate to a performed exam, to include automated exposure control,   adjustment of the mA and/or kV according to patient size (including appropriate   matching first site-specific examinations), or use of iterative reconstruction   technique. COMPARISON: 7/5/2019. ABDOMEN FINDINGS:        Liver: Unremarkable. Spleen: Several coarse calcifications in the spleen compatible with remote   granulomatous infection. Pancreas: Unremarkable. Biliary: Unremarkable. Bowel: Bowel loops in the pelvis are mildly angulated. No bowel obstruction. Normal appendix. No pneumatosis. Peritoneum/ Retroperitoneum: No free fluid in the abdomen. No free air. There is   ill-defined soft tissue density in the periuterine space and in the right   adnexa. Lymph Nodes: Unremarkable. Adrenal Glands: Unremarkable. Kidneys: Unremarkable. Vessels: Unremarkable for age. PELVIS FINDINGS:        Bladder/ Pelvic Organs: Bladder is normal. Uterus is anteverted with likely   multiple nabothian cysts. 8.7 x 7.1 x 6.3 cm mildly thick-walled complex   structure in the right of midline pelvis. There are several abutting cysts. Left   ovary is not visualized. Lung Base: Unremarkable. Bones: Unremarkable for age. CXR Results  (Last 48 hours)    None          Medical Decision Making   I am the first provider for this patient. I reviewed the vital signs, available nursing notes, past medical history, past surgical history, family history and social history. Vital Signs-Reviewed the patient's vital signs. No data found. Records Reviewed: Nursing Notes and Old Medical Records    Provider Notes (Medical Decision Making):   DDx: Appendicitis, Ovarian cyst, Hemorrhagic cyst, Endometriosis, Kidney stone, UTI, Gonorrhea, Chlamydia, Trich, BV, Yeast    25 yo F with complaints of sharp right flank pain x 1 mo. Hx right ovarian cyst and endometriosis. ED Course:   Initial assessment performed. The patients presenting problems have been discussed, and they are in agreement with the care plan formulated and outlined with them. I have encouraged them to ask questions as they arise throughout their visit. 2030  Transfer of care to Dr. Carmel Austin, attending at shift change. Awaiting CT. PLAN:  1. Discharge Medication List as of 2/20/2020  2:33 AM      START taking these medications    Details   ibuprofen (MOTRIN) 600 mg tablet Take 1 Tab by mouth every six (6) hours as needed for Pain., Print, Disp-30 Tab, R-0      metroNIDAZOLE (FLAGYL) 500 mg tablet Take 1 Tab by mouth two (2) times a day for 7 days. , Print, Disp-14 Tab, R-0         CONTINUE these medications which have NOT CHANGED    Details   methylPREDNISolone (MEDROL, MYRA,) 4 mg tablet Take  by mouth Specific Days and Specific Times., Historical Med      acetaminophen (TYLENOL PO) Take  by mouth as needed., Historical Med      naproxen sodium (ALEVE) 220 mg cap Take  by mouth as needed., Historical Med           2. Follow-up Information     Follow up With Specialties Details Why Contact Info    JOSEPHINE Isaac Physician Assistant In 3 days For Follow Up         Return to ED if worse     Diagnosis     Clinical Impression:   1.  BV (bacterial vaginosis)    2. Cyst of right ovary        Attestations:    JOSEPHINE Denny    Please note that this dictation was completed with [employer], the computer voice recognition software. Quite often unanticipated grammatical, syntax, homophones, and other interpretive errors are inadvertently transcribed by the computer software. Please disregard these errors. Please excuse any errors that have escaped final proofreading. Thank you.

## 2020-02-20 VITALS
BODY MASS INDEX: 21.49 KG/M2 | HEART RATE: 80 BPM | WEIGHT: 129 LBS | HEIGHT: 65 IN | DIASTOLIC BLOOD PRESSURE: 60 MMHG | SYSTOLIC BLOOD PRESSURE: 109 MMHG | OXYGEN SATURATION: 100 % | TEMPERATURE: 98.5 F | RESPIRATION RATE: 14 BRPM

## 2020-02-20 LAB
C TRACH RRNA SPEC QL NAA+PROBE: NEGATIVE
N GONORRHOEA RRNA SPEC QL NAA+PROBE: NEGATIVE
SPECIMEN SOURCE: NORMAL

## 2020-02-20 PROCEDURE — 74011250637 HC RX REV CODE- 250/637: Performed by: EMERGENCY MEDICINE

## 2020-02-20 RX ORDER — IBUPROFEN 600 MG/1
600 TABLET ORAL
Status: COMPLETED | OUTPATIENT
Start: 2020-02-20 | End: 2020-02-20

## 2020-02-20 RX ORDER — METRONIDAZOLE 500 MG/1
500 TABLET ORAL
Status: COMPLETED | OUTPATIENT
Start: 2020-02-20 | End: 2020-02-20

## 2020-02-20 RX ORDER — IBUPROFEN 600 MG/1
600 TABLET ORAL
Qty: 30 TAB | Refills: 0 | Status: SHIPPED | OUTPATIENT
Start: 2020-02-20

## 2020-02-20 RX ORDER — METRONIDAZOLE 500 MG/1
500 TABLET ORAL 2 TIMES DAILY
Qty: 14 TAB | Refills: 0 | Status: SHIPPED | OUTPATIENT
Start: 2020-02-20 | End: 2020-02-27

## 2020-02-20 RX ADMIN — METRONIDAZOLE 500 MG: 500 TABLET ORAL at 02:36

## 2020-02-20 RX ADMIN — IBUPROFEN 600 MG: 600 TABLET ORAL at 02:36

## 2020-02-20 NOTE — ED NOTES
Pt is resting with eyes closed. Dahlia Moreno awakens easily to verbal... denies pain. Dahlia Moreno still waiting for us report. Dahlia Moreno

## 2020-02-20 NOTE — ED NOTES
Pt says she feels better,ie, no pain no nausea. .. and, wants to go home. . parents at bedside. .. dr Vaughn Barnhart is informed. Salima Ovalle

## 2020-02-20 NOTE — DISCHARGE INSTRUCTIONS
Patient Education        Bacterial Vaginosis: Care Instructions  Your Care Instructions    Bacterial vaginosis is a type of vaginal infection. It is caused by excess growth of certain bacteria that are normally found in the vagina. Symptoms can include itching, swelling, pain when you urinate or have sex, and a gray or yellow discharge with a \"fishy\" odor. It is not considered an infection that is spread through sexual contact. Although symptoms can be annoying and uncomfortable, bacterial vaginosis does not usually cause other health problems. However, if you have it while you are pregnant, it can cause complications. While the infection may go away on its own, most doctors use antibiotics to treat it. You may have been prescribed pills or vaginal cream. With treatment, bacterial vaginosis usually clears up in 5 to 7 days. Follow-up care is a key part of your treatment and safety. Be sure to make and go to all appointments, and call your doctor if you are having problems. It's also a good idea to know your test results and keep a list of the medicines you take. How can you care for yourself at home? · Take your antibiotics as directed. Do not stop taking them just because you feel better. You need to take the full course of antibiotics. · Do not eat or drink anything that contains alcohol if you are taking metronidazole (Flagyl). · Keep using your medicine if you start your period. Use pads instead of tampons while using a vaginal cream or suppository. Tampons can absorb the medicine. · Wear loose cotton clothing. Do not wear nylon and other materials that hold body heat and moisture close to the skin. · Do not scratch. Relieve itching with a cold pack or a cool bath. · Do not wash your vaginal area more than once a day. Use plain water or a mild, unscented soap. Do not douche. When should you call for help?   Watch closely for changes in your health, and be sure to contact your doctor if:    · You have unexpected vaginal bleeding.     · You have a fever.     · You have new or increased pain in your vagina or pelvis.     · You are not getting better after 1 week.     · Your symptoms return after you finish the course of your medicine. Where can you learn more? Go to http://almita-kassi.info/. Arthur Ryan in the search box to learn more about \"Bacterial Vaginosis: Care Instructions. \"  Current as of: February 19, 2019  Content Version: 12.2  © 8384-9243 [employer]. Care instructions adapted under license by [employer] (which disclaims liability or warranty for this information). If you have questions about a medical condition or this instruction, always ask your healthcare professional. [address] any warranty or liability for your use of this information. Patient Education        Hemorrhagic Ovarian Cyst: Care Instructions  Your Care Instructions    Sometimes a sac forms on the surface of a woman's ovary. When the sac swells up with fluid, it forms a cyst. If the cyst bleeds, it is called a hemorrhagic (say \"tge-xdz-CL-jick\") ovarian cyst. If the cyst breaks open, blood and fluid spill out into the lower belly and pelvis. You may not have symptoms from the cyst. But if it is large, or if it twists or breaks open, you may have pain or other problems. You may feel pain from the cyst or have symptoms from losing blood. Your doctor may use a pelvic ultrasound to see if you have a cyst. Blood tests can help your doctor tell if the cyst is bleeding or you have lost a lot of blood. Treatment depends on your symptoms. If they are mild, your doctor may suggest carefully watching your symptoms and doing blood tests again. But if you have a cyst that is very large, bleeds a lot, or causes other problems, your doctor may suggest surgery to remove it. If the bleeding is heavy, you may also need treatment to replace the blood.   Follow-up care is a key part of your treatment and safety. Be sure to make and go to all appointments, and call your doctor if you are having problems. It's also a good idea to know your test results and keep a list of the medicines you take. How can you care for yourself at home? · Use heat, such as a warm water bottle, a heating pad set on low, or a warm bath, to relax tense muscles and relieve cramping. · Be safe with medicines. Read and follow all instructions on the label. ? If the doctor gave you a prescription medicine for pain, take it as prescribed. ? If you are not taking a prescription pain medicine, ask your doctor if you can take an over-the-counter medicine. When should you call for help? Call 911 anytime you think you may need emergency care. For example, call if:    · You passed out (lost consciousness).    Call your doctor now or seek immediate medical care if:    · You have severe vaginal bleeding.     · You are dizzy or lightheaded, or you feel like you may faint.     · You have new or worse pain in your belly or pelvis.    Watch closely for changes in your health, and be sure to contact your doctor if:    · You think you may be pregnant.     · You do not get better as expected. Where can you learn more? Go to http://almita-kassi.info/. Enter D256 in the search box to learn more about \"Hemorrhagic Ovarian Cyst: Care Instructions. \"  Current as of: February 19, 2019  Content Version: 12.2  © 9251-3722 [employer]. Care instructions adapted under license by [employer] (which disclaims liability or warranty for this information). If you have questions about a medical condition or this instruction, always ask your healthcare professional. [place] [address] any warranty or liability for your use of this information.

## 2020-04-17 ENCOUNTER — OFFICE VISIT (OUTPATIENT)
Dept: ONCOLOGY | Age: 30
End: 2020-04-17

## 2020-04-17 VITALS
RESPIRATION RATE: 14 BRPM | BODY MASS INDEX: 19.88 KG/M2 | WEIGHT: 119.3 LBS | TEMPERATURE: 98.1 F | SYSTOLIC BLOOD PRESSURE: 134 MMHG | HEART RATE: 112 BPM | OXYGEN SATURATION: 100 % | HEIGHT: 65 IN | DIASTOLIC BLOOD PRESSURE: 89 MMHG

## 2020-04-17 DIAGNOSIS — N80.9 ENDOMETRIOSIS: Primary | ICD-10-CM

## 2020-04-17 DIAGNOSIS — N83.201 CYST OF RIGHT OVARY: ICD-10-CM

## 2020-04-17 RX ORDER — NORETHINDRONE ACETATE AND ETHINYL ESTRADIOL AND FERROUS FUMARATE 1MG-20(21)
KIT ORAL
COMMUNITY
Start: 2020-03-24

## 2020-04-17 NOTE — PROGRESS NOTES
[facility]  [address] [place] [address], [address], [address] [facility]  [address] [facility], [place], [address] [place] [address]  [place], [address]   [phone]  Kyle Means         Patient ID:  Name: Dorene Meeks  MRM: [id number]  : 1990/29 y.o. Date: 2020    SUBJECTIVE:    This is a 26 y.o.  female with h/o endometriosis s/p Laparoscopic resection of pelvic mass including left salpingo-oophorectomy with extensive lysis of adhesions for greater than 60 minutes. on 2019. Was seen in primary gyn office and had an ultrasound as below. Has started OCP two weeks ago and feeling much better. Pain improved and has more energy. Her pathology revealed      LEFT OVARY AND FALLOPIAN TUBE:   ENDOMETRIOTIC CYST OF OVARY       Imaging  Scanned in media  10 x8 x 10 cm right ovarian complex cyst  Past Medical History:   Diagnosis Date    Asthma     childhood    Other ill-defined conditions(799.89)     Vaginal birth 2012    Ovarian cyst      Past Surgical History:   Procedure Laterality Date    HX LEFT SALPINGO-OOPHORECTOMY  2019    w/ pelvic mass resection       Medications:     Current Outpatient Medications on File Prior to Visit   Medication Sig Dispense Refill    ibuprofen (MOTRIN) 600 mg tablet Take 1 Tab by mouth every six (6) hours as needed for Pain. 30 Tab 0    methylPREDNISolone (MEDROL, MYRA,) 4 mg tablet Take  by mouth Specific Days and Specific Times.  acetaminophen (TYLENOL PO) Take  by mouth as needed.  naproxen sodium (ALEVE) 220 mg cap Take  by mouth as needed. No current facility-administered medications on file prior to visit.         Allergies:   No Known Allergies    OBJECTIVE:    Vitals:   Visit Vitals  /89 (BP 1 Location: Left arm, BP Patient Position: Sitting)   Pulse (!) 112   Temp 98.1 °F (36.7 °C) (Oral)   Resp 14   Ht 5' 5\" (1.651 m)   Wt 54.1 kg (119 lb 4.8 oz)   LMP 2020 SpO2 100%   BMI 19.85 kg/m²       Physical Examination:    General:  alert, cooperative, no distress   Abdomen: soft, bowel sounds active, non-tender   Incision: healing well   Pelvic: NEFG, spec exam revealed normal cervix. BME revealed mass in cul de sac, NT, no nodularity   Rectal: not done   Extremity:   extremities normal, atraumatic, no cyanosis or edema     IMPRESSION/PLAN:    1. H/o endometriosis now with right ovarian cyst   -reviewed imaging and explained likely another endometrioma   -given improvement on OCP will monitor for 2 months and f/u for ultrasound. If cyst is getting smaller can monitor   -if anything changes symptomatically encouraged her to call to get in sooner   -f/u in 2 months          Total time spent was 40 minutes regarding diagnosis of right ovarian cyst and >50% of this time was spent counseling and coordinating care.     Jasmin Wu DO  Gynecologic Oncology  4/17/2020/7:41 AM

## 2020-04-17 NOTE — Clinical Note
4/17/20 Patient: Chelsey Duong YOB: 1990 Date of Visit: 4/17/2020 JOSEPHINE Mitchell 
VIA Dear JOSEPHINE Mitchell, Thank you for referring Ms. Isa Perales to [facility] for evaluation. My notes for this consultation are attached. If you have questions, please do not hesitate to call me. I look forward to following your patient along with you. Sincerely, Humberto Davila MD

## 2020-04-17 NOTE — PROGRESS NOTES
Julieta Lea is a [address] [place] y.o. female presents in office for follow up visit, 10 cm ovarian mass. Chief Complaint   Patient presents with    Follow-up     Mass        Do you have any unusual vaginal bleeding, discharge or irritation? No  Do you have any changes in your bowel movements? Pt c/o constant constipation for 3 months  Have you been experiencing nausea or vomiting? PT c/o nausea and vomiting from constipation. Have you been experiencing any continuous or worsening abdominal pain? Pt c/o abdominal pain of 10/10 prior to new birth control prescription, has decreased to 5/10 with birth control. Any urinary burning? Pt c/o frequent urination with odor with occasional cloudiness. Visit Vitals  /89 (BP 1 Location: Left arm, BP Patient Position: Sitting)   Pulse (!) 112   Temp 98.1 °F (36.7 °C) (Oral)   Resp 14   Ht 5' 5\" (1.651 m)   Wt 54.1 kg (119 lb 4.8 oz)   SpO2 100%   BMI 19.85 kg/m²         Health Maintenance Due   Topic Date Due    DTaP/Tdap/Td series (1 - Tdap) 10/11/2010    PAP AKA CERVICAL CYTOLOGY  10/11/2010         1. Have you been to the ER, urgent care clinic since your last visit? Hospitalized since your last visit? Pt was seen in ED multiple times for abdominal pain, has not been seen since starting birth control. 2. Have you seen or consulted any other health care providers outside of the [address] [place] [address] since your last visit? Include any pap smears or colon screening.  No    3 most recent PHQ Screens 7/10/2019   Little interest or pleasure in doing things Not at all   Feeling down, depressed, irritable, or hopeless Not at all   Total Score PHQ 2 0         Learning Assessment 7/10/2019   PRIMARY LEARNER Patient   HIGHEST LEVEL OF EDUCATION - PRIMARY LEARNER  DID NOT GRADUATE HIGH SCHOOL   BARRIERS PRIMARY LEARNER NONE   CO-LEARNER CAREGIVER No   PRIMARY LANGUAGE ENGLISH   LEARNER PREFERENCE PRIMARY DEMONSTRATION   ANSWERED BY patient   RELATIONSHIP SELF

## 2020-06-17 ENCOUNTER — HOSPITAL ENCOUNTER (OUTPATIENT)
Dept: ULTRASOUND IMAGING | Age: 30
Discharge: HOME | End: 2020-06-17
Attending: OBSTETRICS & GYNECOLOGY
Payer: MEDICAID

## 2020-06-17 DIAGNOSIS — N80.9 ENDOMETRIOSIS: ICD-10-CM

## 2020-06-17 PROCEDURE — 76830 TRANSVAGINAL US NON-OB: CPT

## 2020-06-19 ENCOUNTER — OFFICE VISIT (OUTPATIENT)
Dept: ONCOLOGY | Age: 30
End: 2020-06-19

## 2020-06-19 VITALS
WEIGHT: 113.6 LBS | HEART RATE: 88 BPM | DIASTOLIC BLOOD PRESSURE: 73 MMHG | SYSTOLIC BLOOD PRESSURE: 112 MMHG | OXYGEN SATURATION: 100 % | HEIGHT: 65 IN | BODY MASS INDEX: 18.93 KG/M2 | TEMPERATURE: 98.7 F

## 2020-06-19 DIAGNOSIS — N80.9 ENDOMETRIOSIS: Primary | ICD-10-CM

## 2020-06-19 NOTE — PROGRESS NOTES
[address] [facility]  [address] [place] [address], [address], [address] [facility]  [address] [facility], [address] [facility]  [place], [address]   [phone]  Joe Faria DO        Patient ID:  Name: Esdras Burnett  MRM: [id number]  : 1990/29 y.o. Date: 2020    SUBJECTIVE:    This is a 26 y.o.  female with h/o endometriosis s/p Laparoscopic resection of pelvic mass including left salpingo-oophorectomy with extensive lysis of adhesions for greater than 60 minutes. on 2019. Was seen in primary gyn office and had an ultrasound as below. Tolerating OCP. No abdominopelvic pain or abnormal bleeding. Her pathology revealed      LEFT OVARY AND FALLOPIAN TUBE:   ENDOMETRIOTIC CYST OF OVARY       Imaging  Ultrasound 2020  FINDINGS:       Uterus: Positioned slightly left of midline. Measures 7 cm length. Endometrial  thickness 1.4 cm. Nabothian cysts are noted. Within the right lower uterine  segment, there is a 1.4 cm rounded hypoechoic fibroid. Left ovary/adnexa:  Previous left oophorectomy  Right ovary/adnexa: Positioned towards the midline, large-caliber uniformly  hypoechoic cystic focus present within the right ovary. It is more uniformly  hypoechoic, somewhat more echogenic than prior, consistent with evolving blood  product such as from chocolate cyst. This focus measures 9.4 x 8.5 x 7.0 cm. A  previously seen more complex component has virtually involuted. Spectral Doppler  analysis demonstrates preserved waveform morphology and flow of the right ovary.     Additional comments: None      IMPRESSION  IMPRESSION:       Large caliber chocolate cyst of the presumed retained right ovary. No solid or  aggressive features.  No torsion.  -Absent left ovary      Scanned in media  10 x8 x 10 cm right ovarian complex cyst  Past Medical History:   Diagnosis Date    Asthma     childhood    Other ill-defined conditions(518.22) Vaginal birth 2012    Ovarian cyst      Past Surgical History:   Procedure Laterality Date    HX GYN      Left salpingo oophorectomy    HX LEFT SALPINGO-OOPHORECTOMY  07/31/2019    w/ pelvic mass resection       Medications:     Current Outpatient Medications on File Prior to Visit   Medication Sig Dispense Refill    Junel FE 1/20, 28, 1 mg-20 mcg (21)/75 mg (7) tab       ibuprofen (MOTRIN) 600 mg tablet Take 1 Tab by mouth every six (6) hours as needed for Pain. 30 Tab 0    methylPREDNISolone (MEDROL, MYRA,) 4 mg tablet Take  by mouth Specific Days and Specific Times.  acetaminophen (TYLENOL PO) Take  by mouth as needed.  naproxen sodium (ALEVE) 220 mg cap Take  by mouth as needed. No current facility-administered medications on file prior to visit. Allergies:   No Known Allergies    OBJECTIVE:    Vitals:   Visit Vitals  /73 (BP 1 Location: Left arm, BP Patient Position: Sitting)   Pulse 88   Temp 98.7 °F (37.1 °C) (Oral)   Ht 5' 5\" (1.651 m)   Wt 51.5 kg (113 lb 9.6 oz)   SpO2 100%   BMI 18.90 kg/m²       Physical Examination:    General:  alert, cooperative, no distress     IMPRESSION/PLAN:    1. H/o endometriosis now with right ovarian cyst   -reviewed imaging c/w endometrioma and is slightly smaller   -we discussed surgery vs. Observation   -given asymptomatic pt would like to pursue surveillance   -cont OCP   -will repeat ultrasound and f/u in 3 months   -          Total time spent was 40 minutes regarding diagnosis of right ovarian cyst and >50% of this time was spent counseling and coordinating care.     Soni Jensen DO  Gynecologic Oncology  6/19/2020/7:41 AM

## 2020-06-19 NOTE — PROGRESS NOTES
Naheed Monterroso Alise Pulido is a 26 y.o. female presents in office for follow-up review results     No chief complaint on file. Do you have any unusual vaginal bleeding, discharge or irritation? Do you have any changes in your bowel movements? Have you been experiencing any worsening abdominal pain or discomfort? There were no vitals taken for this visit. Health Maintenance Due   Topic Date Due    DTaP/Tdap/Td series (1 - Tdap) 10/11/2010    PAP AKA CERVICAL CYTOLOGY  10/11/2010         1. Have you been to the ER, urgent care clinic since your last visit? Hospitalized since your last visit? No    2. Have you seen or consulted any other health care providers outside of the [address] [place] [address] since your last visit? Include any pap smears or colon screening.  No    3 most recent PHQ Screens 4/17/2020   Little interest or pleasure in doing things Not at all   Feeling down, depressed, irritable, or hopeless Not at all   Total Score PHQ 2 0       Learning Assessment 7/10/2019   PRIMARY LEARNER Patient   HIGHEST LEVEL OF EDUCATION - PRIMARY LEARNER  DID NOT GRADUATE HIGH SCHOOL   BARRIERS PRIMARY LEARNER NONE   CO-LEARNER CAREGIVER No   PRIMARY LANGUAGE ENGLISH   LEARNER PREFERENCE PRIMARY DEMONSTRATION   ANSWERED BY patient   RELATIONSHIP SELF

## 2020-06-19 NOTE — LETTER
6/19/20 Patient: Dorene Meeks YOB: 1990 Date of Visit: 6/19/2020 Ab Joshi MD 
[address] [place] [address] VIA Facsimile: [phone] Dear Ab Joshi MD, Thank you [facility]. My notes for this consultation are attached. If you have questions, please do not hesitate to call me. I look forward to following your patient along with you. Sincerely, Aury Soto MD

## 2020-09-22 ENCOUNTER — TELEPHONE (OUTPATIENT)
Dept: ONCOLOGY | Age: 30
End: 2020-09-22

## 2020-09-22 DIAGNOSIS — N80.9 ENDOMETRIOSIS: Primary | ICD-10-CM

## 2020-09-22 NOTE — TELEPHONE ENCOUNTER
Received call from [address] [place] [address] requesting an ultrasound order be placed in the system for patient's upcoming scan.

## 2020-09-23 ENCOUNTER — HOSPITAL ENCOUNTER (OUTPATIENT)
Dept: ULTRASOUND IMAGING | Age: 30
Discharge: HOME | End: 2020-09-23
Attending: OBSTETRICS & GYNECOLOGY
Payer: MEDICAID

## 2020-09-23 DIAGNOSIS — N80.9 ENDOMETRIOSIS: ICD-10-CM

## 2020-09-23 PROCEDURE — 93975 VASCULAR STUDY: CPT

## 2020-09-29 DIAGNOSIS — N80.9 ENDOMETRIOSIS: ICD-10-CM

## 2020-10-02 ENCOUNTER — OFFICE VISIT (OUTPATIENT)
Dept: ONCOLOGY | Age: 30
End: 2020-10-02
Payer: MEDICAID

## 2020-10-02 VITALS — WEIGHT: 104.6 LBS | HEIGHT: 65 IN | BODY MASS INDEX: 17.43 KG/M2

## 2020-10-02 DIAGNOSIS — N83.201 CYST OF RIGHT OVARY: ICD-10-CM

## 2020-10-02 DIAGNOSIS — N80.9 ENDOMETRIOSIS: ICD-10-CM

## 2020-10-02 DIAGNOSIS — N83.291 OTHER OVARIAN CYST, RIGHT SIDE: Primary | ICD-10-CM

## 2020-10-02 PROCEDURE — 99214 OFFICE O/P EST MOD 30 MIN: CPT | Performed by: OBSTETRICS & GYNECOLOGY

## 2020-10-02 NOTE — PROGRESS NOTES
[address], [address], [address] [facility]  [address] [facility], [address] [place] [address]  [place], [address]   [phone]  Jamie Phillips DO        Patient ID:  Name: Enoch Lane  MRM: [id number]  : 1990/29 y.o. Date: 10/2/2020    SUBJECTIVE:    This is a 26 y.o.  female with h/o endometriosis s/p Laparoscopic resection of pelvic mass including left salpingo-oophorectomy with extensive lysis of adhesions for greater than 60 minutes. on 2019. Was seen by gyn and had an ultrasound revealing endometrioma. Pt declined surgery at that time and was started on ocps. Stopped ocp about 1 month ago. Having some low back pain and currently being treated for UTI  Here to review ultrasound      Her pathology revealed      LEFT OVARY AND FALLOPIAN TUBE:   ENDOMETRIOTIC CYST OF OVARY       Imaging  TVUS 20  FINDINGS:     The uterus is anteverted. It measures 7.3 x 4.6 x 1.6 cm. It is mildly  flattened and displaced anteriorly by a large homogeneous hypoechoic mass. The endometrial echo is well seen with the double thickness endometrial echo  measuring 0.7 cm. There is no fluid within the endometrial canal.  There is no  gestational sac. Myometrial echo pattern is normal.  In the cul-de-sac slightly more eccentrically to the left is a homogeneous  hypoechoic mass which measures 11.5 x 9.7 x 11.2 cm with a thin rim of  peripheral soft tissue. Previous measurement of 9.4 x 8.5 x 7 cm. Small soft  tissue nodule arising off the anterior wall measures 2.6 x 1.9 x 1.1 cm  previously this small area appeared more cystic. Separate ovaries are not demonstrated.   There is no fluid in the cul de sac.     IMPRESSION  IMPRESSION:     Large cul-de-sac mass of complicated cyst measuring approximately 11 cm  increased in size from the previous exam. Differential diagnosis of hemorrhagic  right ovarian cyst vs. endometrioma. Ultrasound 6/17/2020  FINDINGS:       Uterus: Positioned slightly left of midline. Measures 7 cm length. Endometrial  thickness 1.4 cm. Nabothian cysts are noted. Within the right lower uterine  segment, there is a 1.4 cm rounded hypoechoic fibroid. Left ovary/adnexa:  Previous left oophorectomy  Right ovary/adnexa: Positioned towards the midline, large-caliber uniformly  hypoechoic cystic focus present within the right ovary. It is more uniformly  hypoechoic, somewhat more echogenic than prior, consistent with evolving blood  product such as from chocolate cyst. This focus measures 9.4 x 8.5 x 7.0 cm. A  previously seen more complex component has virtually involuted. Spectral Doppler  analysis demonstrates preserved waveform morphology and flow of the right ovary.     Additional comments: None      IMPRESSION  IMPRESSION:       Large caliber chocolate cyst of the presumed retained right ovary. No solid or  aggressive features. No torsion.  -Absent left ovary      Scanned in media  10 x8 x 10 cm right ovarian complex cyst  Past Medical History:   Diagnosis Date    Asthma     childhood    Other ill-defined conditions(799.89)     Vaginal birth 2012    Ovarian cyst      Past Surgical History:   Procedure Laterality Date    HX GYN      Left salpingo oophorectomy    HX LEFT SALPINGO-OOPHORECTOMY  07/31/2019    w/ pelvic mass resection       Medications:     Current Outpatient Medications on File Prior to Visit   Medication Sig Dispense Refill    acetaminophen (TYLENOL PO) Take  by mouth as needed.  Junel FE 1/20, 28, 1 mg-20 mcg (21)/75 mg (7) tab       ibuprofen (MOTRIN) 600 mg tablet Take 1 Tab by mouth every six (6) hours as needed for Pain. 30 Tab 0    methylPREDNISolone (MEDROL, MYRA,) 4 mg tablet Take  by mouth Specific Days and Specific Times.  naproxen sodium (ALEVE) 220 mg cap Take  by mouth as needed. No current facility-administered medications on file prior to visit. Allergies: Allergies   Allergen Reactions    Ciprofloxacin Palpitations       OBJECTIVE:    Vitals:   Visit Vitals  Ht 5' 5\" (1.651 m)   Wt 47.4 kg (104 lb 9.6 oz)   BMI 17.41 kg/m²       Physical Examination:    General:  alert, cooperative, no distress     IMPRESSION/PLAN:    1. H/o endometriosis now with right ovarian cyst   -reviewed imaging c/w endometrioma with increase in size   -we discussed surgery vs. Resuming OCP and reimage in 3 months   -given asymptomatic pt would like to pursue surveillance   -cont OCP   -will repeat ultrasound and f/u in 3 months   -          Total time spent was 25 minutes regarding diagnosis of right ovarian cyst and >50% of this time was spent counseling and coordinating care.     Mechelle Warren DO  Gynecologic Oncology  10/2/2020/7:41 AM

## 2020-10-02 NOTE — PROGRESS NOTES
Enoch Lane is a 26 y.o. female presents in office for results. Chief Complaint   Patient presents with    Results      Pt c/o generalized feeling of weakness. Do you have any unusual vaginal bleeding, discharge or irritation? No  Do you have any changes in your bowel movements? Pt c/o constipation, recently diagnosed with hemorrhoids. Have you been experiencing nausea or vomiting? Pt c/o nausea last month  Have you been experiencing any continuous or worsening abdominal pain? No  Any urinary burning? Pt c/o lack of urge for urination. Visit Vitals  Ht 5' 5\" (1.651 m)   Wt 47.4 kg (104 lb 9.6 oz)   BMI 17.41 kg/m²         1. Have you been to the ER, urgent care clinic since your last visit? Hospitalized since your last visit? No    2. Have you seen or consulted any other health care providers outside of the [address] [place] [address] since your last visit? Include any pap smears or colon screening.  Pt seen by Gastroenterologist.    3 most recent PHQ Screens 4/17/2020   Little interest or pleasure in doing things Not at all   Feeling down, depressed, irritable, or hopeless Not at all   Total Score PHQ 2 0       Learning Assessment 7/10/2019   PRIMARY LEARNER Patient   HIGHEST LEVEL OF EDUCATION - PRIMARY LEARNER  DID NOT GRADUATE HIGH SCHOOL   BARRIERS PRIMARY LEARNER NONE   CO-LEARNER CAREGIVER No   PRIMARY LANGUAGE ENGLISH   LEARNER PREFERENCE PRIMARY DEMONSTRATION   ANSWERED BY patient   RELATIONSHIP SELF

## 2020-10-02 NOTE — LETTER
10/2/20 Patient: Vernon Wang YOB: 1990 Date of Visit: 10/2/2020 Mia Armenta MD 
[address] [place] [address] VIA Facsimile: [phone] Dear Mia Armenta MD, Thank you for referring Ms. Cristy Hunter to [facility] for evaluation. My notes for this consultation are attached. If you have questions, please do not hesitate to call me. I look forward to following your patient along with you. Sincerely, Onofre Lanza MD

## 2021-01-02 DIAGNOSIS — N83.291 OTHER OVARIAN CYST, RIGHT SIDE: ICD-10-CM

## (undated) DEVICE — APPLICATOR BNDG 1MM ADH PREMIERPRO EXOFIN

## (undated) DEVICE — SUTURE SZ 0 27IN 5/8 CIR UR-6  TAPER PT VIOLET ABSRB VICRYL J603H

## (undated) DEVICE — WINGED PERI-PAD,MODERATE: Brand: CURITY

## (undated) DEVICE — (D)PREP SKN CHLRAPRP APPL 26ML -- CONVERT TO ITEM 371833

## (undated) DEVICE — Device

## (undated) DEVICE — BLADELESS OPTICAL TROCAR WITH FIXATION CANNULA: Brand: VERSAPORT

## (undated) DEVICE — VISUALIZATION SYSTEM: Brand: CLEARIFY

## (undated) DEVICE — BLUNT TROCAR WITH THREADED ANCHOR: Brand: VERSAONE

## (undated) DEVICE — SUTURE MCRYL SZ 4-0 L27IN ABSRB UD L24MM PS-1 3/8 CIR PRIM Y935H

## (undated) DEVICE — GARMENT,MEDLINE,DVT,INT,CALF,MED, GEN2: Brand: MEDLINE

## (undated) DEVICE — LEGGINGS, PAIR, 31X48, STERILE: Brand: MEDLINE

## (undated) DEVICE — TRAY PHAR SYR 10ML CLR PLAS STD N CTRL LUERLOCK TIP

## (undated) DEVICE — BULB SYRINGE, IRRIGATION WITH PROTECTIVE CAP, 60 CC, INDIVIDUALLY WRAPPED: Brand: DOVER

## (undated) DEVICE — SYR 50ML LR LCK 1ML GRAD NSAF --

## (undated) DEVICE — TRAP MUCUS SPECIMEN 40ML -- MEDICHOICE

## (undated) DEVICE — MAYO STAND COVER: Brand: CONVERTORS

## (undated) DEVICE — STERILE POLYISOPRENE POWDER-FREE SURGICAL GLOVES: Brand: PROTEXIS

## (undated) DEVICE — SHEAR RMFG HARMONIC 5MMX36CM -- LAWSON OEM ITEM 322219

## (undated) DEVICE — INTENDED FOR TISSUE SEPARATION, AND OTHER PROCEDURES THAT REQUIRE A SHARP SURGICAL BLADE TO PUNCTURE OR CUT.: Brand: BARD-PARKER ® DISPOSABLE SCALPELS

## (undated) DEVICE — TRAY CATH OD16FR SIL URIN M STATLOK STBL DEV SURSTP

## (undated) DEVICE — SOLUTION IV 1000ML 0.9% SOD CHL

## (undated) DEVICE — COVER LT HNDL BLU STRL -- MEDICHOICE

## (undated) DEVICE — TRAY PREP DRY W/ PREM GLV 2 APPL 6 SPNG 2 UNDPD 1 OVERWRAP

## (undated) DEVICE — GOWN,AURORA,NONRNF,XL,30/CS: Brand: MEDLINE

## (undated) DEVICE — DRAPE,UNDERBUTTOCKS,PCH,STERILE: Brand: MEDLINE

## (undated) DEVICE — UNIVERSAL FIXATION CANNULA: Brand: VERSAONE

## (undated) DEVICE — REM POLYHESIVE ADULT PATIENT RETURN ELECTRODE: Brand: VALLEYLAB

## (undated) DEVICE — SOLUTION LACTATED RINGERS INJECTION USP

## (undated) DEVICE — SPONGE HEMOSTAT CELLULS 4X8IN -- SURGICEL